# Patient Record
Sex: MALE | Race: WHITE | Employment: OTHER | URBAN - METROPOLITAN AREA
[De-identification: names, ages, dates, MRNs, and addresses within clinical notes are randomized per-mention and may not be internally consistent; named-entity substitution may affect disease eponyms.]

---

## 2017-07-27 PROBLEM — R44.1 VISUAL HALLUCINATION: Status: ACTIVE | Noted: 2017-07-27

## 2017-07-27 PROBLEM — H53.9 VISUAL CHANGES: Status: ACTIVE | Noted: 2017-07-27

## 2017-08-05 PROCEDURE — 86592 SYPHILIS TEST NON-TREP QUAL: CPT | Performed by: OTHER

## 2017-08-05 PROCEDURE — 82746 ASSAY OF FOLIC ACID SERUM: CPT | Performed by: OTHER

## 2017-08-05 PROCEDURE — 82607 VITAMIN B-12: CPT | Performed by: OTHER

## 2021-04-01 ENCOUNTER — HOSPITAL ENCOUNTER (EMERGENCY)
Facility: HOSPITAL | Age: 76
Discharge: HOME | End: 2021-04-01
Attending: EMERGENCY MEDICINE
Payer: MEDICARE

## 2021-04-01 VITALS
HEART RATE: 70 BPM | RESPIRATION RATE: 18 BRPM | TEMPERATURE: 98 F | SYSTOLIC BLOOD PRESSURE: 117 MMHG | OXYGEN SATURATION: 98 % | DIASTOLIC BLOOD PRESSURE: 56 MMHG

## 2021-04-01 DIAGNOSIS — N30.90 CYSTITIS: Primary | ICD-10-CM

## 2021-04-01 PROCEDURE — 80048 BASIC METABOLIC PNL TOTAL CA: CPT | Performed by: EMERGENCY MEDICINE

## 2021-04-01 PROCEDURE — 87186 SC STD MICRODIL/AGAR DIL: CPT | Performed by: EMERGENCY MEDICINE

## 2021-04-01 PROCEDURE — 99283 EMERGENCY DEPT VISIT LOW MDM: CPT

## 2021-04-01 PROCEDURE — 87077 CULTURE AEROBIC IDENTIFY: CPT | Performed by: EMERGENCY MEDICINE

## 2021-04-01 PROCEDURE — 36415 COLL VENOUS BLD VENIPUNCTURE: CPT

## 2021-04-01 PROCEDURE — 87086 URINE CULTURE/COLONY COUNT: CPT | Performed by: EMERGENCY MEDICINE

## 2021-04-01 PROCEDURE — 85025 COMPLETE CBC W/AUTO DIFF WBC: CPT | Performed by: EMERGENCY MEDICINE

## 2021-04-01 PROCEDURE — 81001 URINALYSIS AUTO W/SCOPE: CPT | Performed by: EMERGENCY MEDICINE

## 2021-04-01 RX ORDER — CEPHALEXIN 500 MG/1
500 CAPSULE ORAL 3 TIMES DAILY
Qty: 21 CAPSULE | Refills: 0 | Status: SHIPPED | OUTPATIENT
Start: 2021-04-01 | End: 2021-04-08

## 2021-04-01 NOTE — ED PROVIDER NOTES
Patient Seen in: [facility] AND [facility] Emergency Department      History   Patient presents with:  Urinary Symptoms    Stated Complaint: urinary burning    HPI/Subjective:   HPI    Patient is a 77-year-old gentleman with history of diabetes hypertension who supple. Cardiovascular: Normal rate, regular rhythm, normal heart sounds and intact distal pulses. Pulmonary/Chest: Effort normal and breath sounds normal. No respiratory distress. Abdominal: Soft.  Bowel sounds are normal. Exhibits no distension and -----------         ------                     CBC W/ DIFFERENTIAL[[id number]]          Abnormal            Final result                 Please view results for these tests on the individual orders.    RAINBOW DRAW BLUE   RAINBOW DRAW LAVENDER   RAINBOW

## 2021-04-03 ENCOUNTER — HOSPITAL ENCOUNTER (EMERGENCY)
Facility: HOSPITAL | Age: 76
Discharge: HOME | End: 2021-04-03
Attending: EMERGENCY MEDICINE
Payer: MEDICARE

## 2021-04-03 VITALS
SYSTOLIC BLOOD PRESSURE: 105 MMHG | WEIGHT: 197 LBS | BODY MASS INDEX: 27.58 KG/M2 | RESPIRATION RATE: 16 BRPM | HEIGHT: 71 IN | DIASTOLIC BLOOD PRESSURE: 72 MMHG | OXYGEN SATURATION: 98 % | HEART RATE: 61 BPM | TEMPERATURE: 98 F

## 2021-04-03 DIAGNOSIS — R73.9 HYPERGLYCEMIA: Primary | ICD-10-CM

## 2021-04-03 PROCEDURE — 82962 GLUCOSE BLOOD TEST: CPT

## 2021-04-03 PROCEDURE — 80048 BASIC METABOLIC PNL TOTAL CA: CPT | Performed by: EMERGENCY MEDICINE

## 2021-04-03 PROCEDURE — 85025 COMPLETE CBC W/AUTO DIFF WBC: CPT | Performed by: EMERGENCY MEDICINE

## 2021-04-03 PROCEDURE — 85025 COMPLETE CBC W/AUTO DIFF WBC: CPT

## 2021-04-03 PROCEDURE — 96360 HYDRATION IV INFUSION INIT: CPT

## 2021-04-03 PROCEDURE — 80048 BASIC METABOLIC PNL TOTAL CA: CPT

## 2021-04-03 PROCEDURE — 99285 EMERGENCY DEPT VISIT HI MDM: CPT

## 2021-04-03 PROCEDURE — 96361 HYDRATE IV INFUSION ADD-ON: CPT

## 2021-04-03 RX ORDER — INSULIN ASPART 100 [IU]/ML
10 INJECTION, SOLUTION INTRAVENOUS; SUBCUTANEOUS ONCE
Status: COMPLETED | OUTPATIENT
Start: 2021-04-03 | End: 2021-04-03

## 2021-04-03 NOTE — ED QUICK NOTES
Mari Rivera arrived to transport patient back home to [facility]. Patient left ED in stable condition. All discharge paperwork provided to [facility].

## 2021-04-03 NOTE — ED PROVIDER NOTES
`  Patient Seen in: [facility] Emergency Department      History   Patient presents with:  Hyperglycemia    Stated Complaint:     HPI/Subjective:   HPI    66-year-old male here yesterday for UTI put on Keflex with Klebsiella sensitive to Keflex here tenderness. Neurological: Alert and oriented to person, place, and time. No appreciated focal deficits  Skin: Skin is warm and dry. Nursing note and vitals reviewed. Differential diagnosis includes hyperglycemia, ongoing UTI.       ED Course     Lab of his blood sugar at home.                          Disposition and Plan     Clinical Impression:  Hyperglycemia  (primary encounter diagnosis)    Disposition:  Discharge  4/3/2021  3:54 pm    Follow-up:  Tsering Ackerman

## 2021-04-11 ENCOUNTER — HOSPITAL ENCOUNTER (EMERGENCY)
Facility: HOSPITAL | Age: 76
Discharge: HOME | End: 2021-04-11
Attending: EMERGENCY MEDICINE
Payer: MEDICARE

## 2021-04-11 VITALS
DIASTOLIC BLOOD PRESSURE: 59 MMHG | WEIGHT: 196.19 LBS | SYSTOLIC BLOOD PRESSURE: 103 MMHG | HEIGHT: 73 IN | TEMPERATURE: 98 F | OXYGEN SATURATION: 98 % | BODY MASS INDEX: 26 KG/M2 | RESPIRATION RATE: 18 BRPM | HEART RATE: 65 BPM

## 2021-04-11 DIAGNOSIS — E11.65 TYPE 2 DIABETES MELLITUS WITH HYPERGLYCEMIA, UNSPECIFIED WHETHER LONG TERM INSULIN USE (HCC): Primary | ICD-10-CM

## 2021-04-11 PROCEDURE — 85025 COMPLETE CBC W/AUTO DIFF WBC: CPT | Performed by: EMERGENCY MEDICINE

## 2021-04-11 PROCEDURE — 80048 BASIC METABOLIC PNL TOTAL CA: CPT | Performed by: EMERGENCY MEDICINE

## 2021-04-11 PROCEDURE — 81001 URINALYSIS AUTO W/SCOPE: CPT | Performed by: EMERGENCY MEDICINE

## 2021-04-11 PROCEDURE — 82962 GLUCOSE BLOOD TEST: CPT

## 2021-04-11 PROCEDURE — 99283 EMERGENCY DEPT VISIT LOW MDM: CPT

## 2021-04-11 PROCEDURE — 36415 COLL VENOUS BLD VENIPUNCTURE: CPT

## 2021-04-11 RX ORDER — INSULIN ASPART 100 [IU]/ML
0.1 INJECTION, SOLUTION INTRAVENOUS; SUBCUTANEOUS ONCE
Status: COMPLETED | OUTPATIENT
Start: 2021-04-11 | End: 2021-04-11

## 2021-04-11 NOTE — CM/SW NOTE
Contacted [address], waiting for the nurse on the pt's floor to call [facility] back.

## 2021-04-11 NOTE — ED PROVIDER NOTES
Patient Seen in: [facility] AND [facility] Emergency Department    History   Patient presents with:  Hyperglycemia    Stated Complaint: high bs    HPI    Patient is here from his living facility and basically here to get treatment for his diabetes.   Patient stat dysuria      Positive for stated complaint: high bs  Other systems are as noted in HPI. Constitutional and vital signs reviewed. All other systems reviewed and negative except as noted above.       Physical Exam     ED Triage Vitals [04/11/21 1056] will follow up with this tomorrow at the facility    ED Course     Labs Reviewed   BASIC METABOLIC PANEL (8) - Abnormal; Notable for the following components:       Result Value    Glucose 229 (*)     Sodium 133 (*)     BUN 82 (*)     Creatinine 2.10 (*) [id number]  [phone]    In 3 days  For re-check      Medications Prescribed:  Current Discharge Medication List

## 2021-04-11 NOTE — CM/SW NOTE
Spoke to the nurse at [facility]. She confirmed that there is not a nurse in the building on Sundays. She looked up his medication profile and it showed that he was not on any insulin and he takes Januvia.  He has medication reminders set up so the staff will

## 2021-04-11 NOTE — ED QUICK NOTES
Patient left ED ambulatory with steady gait to wheelchair to go back to [facility]. Pt speaking full clear sentences without difficulty and not noted to be in any distress. Pt verbalized understanding and importance of follow up and discharge instructions.  ER

## 2021-04-11 NOTE — ED INITIAL ASSESSMENT (HPI)
Pt comes from [facility] with blood sugar of 300. Per medics pt's insulin was taken away so pt can not administer it himself. There are no nurses on sundays to give pt his insulin so he was sent to the ER.

## 2021-04-11 NOTE — CM/SW NOTE
Updated the pt with the plan tomorrow that the nurse will be in to see him and go over his list of medications that he is supposed to be taking.

## 2021-05-14 ENCOUNTER — APPOINTMENT (OUTPATIENT)
Dept: ULTRASOUND IMAGING | Facility: HOSPITAL | Age: 76
End: 2021-05-14
Attending: EMERGENCY MEDICINE
Payer: MEDICARE

## 2021-05-14 ENCOUNTER — HOSPITAL ENCOUNTER (EMERGENCY)
Facility: HOSPITAL | Age: 76
Discharge: HOME | End: 2021-05-14
Attending: EMERGENCY MEDICINE
Payer: MEDICARE

## 2021-05-14 VITALS
SYSTOLIC BLOOD PRESSURE: 117 MMHG | TEMPERATURE: 98 F | HEART RATE: 64 BPM | BODY MASS INDEX: 27.44 KG/M2 | HEIGHT: 71 IN | RESPIRATION RATE: 16 BRPM | WEIGHT: 196 LBS | OXYGEN SATURATION: 99 % | DIASTOLIC BLOOD PRESSURE: 68 MMHG

## 2021-05-14 DIAGNOSIS — R60.0 BILATERAL LOWER EXTREMITY EDEMA: Primary | ICD-10-CM

## 2021-05-14 DIAGNOSIS — L03.90 CELLULITIS, UNSPECIFIED CELLULITIS SITE: ICD-10-CM

## 2021-05-14 PROCEDURE — 93970 EXTREMITY STUDY: CPT | Performed by: EMERGENCY MEDICINE

## 2021-05-14 PROCEDURE — 99284 EMERGENCY DEPT VISIT MOD MDM: CPT

## 2021-05-14 PROCEDURE — 84484 ASSAY OF TROPONIN QUANT: CPT | Performed by: EMERGENCY MEDICINE

## 2021-05-14 PROCEDURE — 85025 COMPLETE CBC W/AUTO DIFF WBC: CPT | Performed by: EMERGENCY MEDICINE

## 2021-05-14 PROCEDURE — 36415 COLL VENOUS BLD VENIPUNCTURE: CPT

## 2021-05-14 PROCEDURE — 80048 BASIC METABOLIC PNL TOTAL CA: CPT | Performed by: EMERGENCY MEDICINE

## 2021-05-14 PROCEDURE — 82962 GLUCOSE BLOOD TEST: CPT

## 2021-05-14 PROCEDURE — 93005 ELECTROCARDIOGRAM TRACING: CPT

## 2021-05-14 PROCEDURE — 83880 ASSAY OF NATRIURETIC PEPTIDE: CPT | Performed by: EMERGENCY MEDICINE

## 2021-05-14 PROCEDURE — 93010 ELECTROCARDIOGRAM REPORT: CPT | Performed by: EMERGENCY MEDICINE

## 2021-05-14 RX ORDER — CEPHALEXIN 500 MG/1
500 CAPSULE ORAL ONCE
Status: COMPLETED | OUTPATIENT
Start: 2021-05-14 | End: 2021-05-14

## 2021-05-14 RX ORDER — CEPHALEXIN 500 MG/1
500 CAPSULE ORAL 3 TIMES DAILY
Qty: 21 CAPSULE | Refills: 0 | Status: ON HOLD | OUTPATIENT
Start: 2021-05-14 | End: 2021-05-24

## 2021-05-14 RX ORDER — CEPHALEXIN 500 MG/1
500 CAPSULE ORAL 3 TIMES DAILY
Qty: 21 CAPSULE | Refills: 0 | Status: SHIPPED | OUTPATIENT
Start: 2021-05-14 | End: 2021-05-21

## 2021-05-14 NOTE — ED INITIAL ASSESSMENT (HPI)
PT came in via EMS for BLE swelling and skin redness for over 2 years. RR even and nonlabored, speaking in full sentences. Denies pain.

## 2021-05-14 NOTE — ED PROVIDER NOTES
Patient Seen in: [facility] Emergency Department    History   Patient presents with:  Rash Skin Problem    Stated Complaint:     HPI    Patient presents to the emergency department for evaluation for possible cellulitis.   Paramedics report that th (LIPITOR OR),  Take by mouth. Insulin Regular Human 100 UNIT/ML Injection Solution,  Inject into the skin once. Furosemide (LASIX OR),  Take 40 mg by mouth 2 (two) times daily. METFORMIN HCL ER OR,  Take by mouth. GABAPENTIN OR,  Take by mouth.    a asymmetry noted. Psychiatric:  Normal affect. Oriented. No unusual behavior. Interacting well. Patient appears with chronic lower extremity edema which she feels is a bit worse today.   We will check ultrasound and blood work    Patient test results and Plan     We recommend that you schedule follow up care with a primary care provider within the next three months to obtain basic health screening including reassessment of your blood pressure.       Clinical Impression:  Bilateral lower extremity edema

## 2021-05-14 NOTE — ED QUICK NOTES
PT safe to DC home per MD. Michel Given to dress self. DC teaching done, pt verbalizes understanding. Wheeled to exit.

## 2021-05-14 NOTE — CM/SW NOTE
Spoke to Dickson at [facility] and inquired where the pt should get his prescription filled. She stated they use [facility] and they deliver the medication.  She also said that the pt can bring a paper script back to [place] with him and she will

## 2021-05-21 ENCOUNTER — APPOINTMENT (OUTPATIENT)
Dept: GENERAL RADIOLOGY | Facility: HOSPITAL | Age: 76
DRG: 291 | End: 2021-05-21
Attending: EMERGENCY MEDICINE
Payer: MEDICARE

## 2021-05-21 ENCOUNTER — HOSPITAL ENCOUNTER (INPATIENT)
Facility: HOSPITAL | Age: 76
LOS: 3 days | Discharge: NURSING, REHAB OR LONG-TERM CARE | DRG: 291 | End: 2021-05-24
Attending: EMERGENCY MEDICINE | Admitting: HOSPITALIST
Payer: MEDICARE

## 2021-05-21 DIAGNOSIS — R60.0 LOWER EXTREMITY EDEMA: ICD-10-CM

## 2021-05-21 DIAGNOSIS — I50.9 ACUTE ON CHRONIC CONGESTIVE HEART FAILURE, UNSPECIFIED HEART FAILURE TYPE (HCC): Primary | ICD-10-CM

## 2021-05-21 PROBLEM — R73.9 HYPERGLYCEMIA: Status: ACTIVE | Noted: 2021-05-21

## 2021-05-21 PROBLEM — D69.6 THROMBOCYTOPENIA (HCC): Status: ACTIVE | Noted: 2021-05-21

## 2021-05-21 PROBLEM — D64.9 ANEMIA: Status: ACTIVE | Noted: 2021-05-21

## 2021-05-21 PROBLEM — R79.89 AZOTEMIA: Status: ACTIVE | Noted: 2021-05-21

## 2021-05-21 PROCEDURE — 99285 EMERGENCY DEPT VISIT HI MDM: CPT

## 2021-05-21 PROCEDURE — 84484 ASSAY OF TROPONIN QUANT: CPT | Performed by: EMERGENCY MEDICINE

## 2021-05-21 PROCEDURE — 81003 URINALYSIS AUTO W/O SCOPE: CPT | Performed by: NURSE PRACTITIONER

## 2021-05-21 PROCEDURE — 82962 GLUCOSE BLOOD TEST: CPT

## 2021-05-21 PROCEDURE — 83880 ASSAY OF NATRIURETIC PEPTIDE: CPT | Performed by: EMERGENCY MEDICINE

## 2021-05-21 PROCEDURE — 96374 THER/PROPH/DIAG INJ IV PUSH: CPT

## 2021-05-21 PROCEDURE — 84466 ASSAY OF TRANSFERRIN: CPT | Performed by: NURSE PRACTITIONER

## 2021-05-21 PROCEDURE — 85025 COMPLETE CBC W/AUTO DIFF WBC: CPT | Performed by: EMERGENCY MEDICINE

## 2021-05-21 PROCEDURE — 82728 ASSAY OF FERRITIN: CPT | Performed by: NURSE PRACTITIONER

## 2021-05-21 PROCEDURE — 83540 ASSAY OF IRON: CPT | Performed by: NURSE PRACTITIONER

## 2021-05-21 PROCEDURE — 93005 ELECTROCARDIOGRAM TRACING: CPT

## 2021-05-21 PROCEDURE — 93010 ELECTROCARDIOGRAM REPORT: CPT | Performed by: EMERGENCY MEDICINE

## 2021-05-21 PROCEDURE — 84145 PROCALCITONIN (PCT): CPT | Performed by: NURSE PRACTITIONER

## 2021-05-21 PROCEDURE — 84443 ASSAY THYROID STIM HORMONE: CPT | Performed by: NURSE PRACTITIONER

## 2021-05-21 PROCEDURE — 83036 HEMOGLOBIN GLYCOSYLATED A1C: CPT | Performed by: NURSE PRACTITIONER

## 2021-05-21 PROCEDURE — 80048 BASIC METABOLIC PNL TOTAL CA: CPT | Performed by: EMERGENCY MEDICINE

## 2021-05-21 PROCEDURE — 82607 VITAMIN B-12: CPT | Performed by: NURSE PRACTITIONER

## 2021-05-21 PROCEDURE — 71045 X-RAY EXAM CHEST 1 VIEW: CPT | Performed by: EMERGENCY MEDICINE

## 2021-05-21 RX ORDER — MULTIVITAMIN WITH IRON
100 TABLET ORAL DAILY
COMMUNITY

## 2021-05-21 RX ORDER — FINASTERIDE 5 MG/1
5 TABLET, FILM COATED ORAL NIGHTLY
COMMUNITY

## 2021-05-21 RX ORDER — BUMETANIDE 0.25 MG/ML
1 INJECTION, SOLUTION INTRAMUSCULAR; INTRAVENOUS
Status: DISCONTINUED | OUTPATIENT
Start: 2021-05-21 | End: 2021-05-24

## 2021-05-21 RX ORDER — ASPIRIN 81 MG/1
81 TABLET ORAL DAILY
Status: ON HOLD | COMMUNITY
End: 2021-05-21

## 2021-05-21 RX ORDER — FINASTERIDE 5 MG/1
5 TABLET, FILM COATED ORAL NIGHTLY
Status: DISCONTINUED | OUTPATIENT
Start: 2021-05-21 | End: 2021-05-24

## 2021-05-21 RX ORDER — DEXTROSE MONOHYDRATE 25 G/50ML
50 INJECTION, SOLUTION INTRAVENOUS
Status: DISCONTINUED | OUTPATIENT
Start: 2021-05-21 | End: 2021-05-24

## 2021-05-21 RX ORDER — POTASSIUM CHLORIDE 20 MEQ/1
20 TABLET, EXTENDED RELEASE ORAL DAILY
Status: ON HOLD | COMMUNITY
End: 2021-08-20

## 2021-05-21 RX ORDER — TAMSULOSIN HYDROCHLORIDE 0.4 MG/1
0.4 CAPSULE ORAL
Status: DISCONTINUED | OUTPATIENT
Start: 2021-05-21 | End: 2021-05-24

## 2021-05-21 RX ORDER — BUMETANIDE 0.25 MG/ML
1 INJECTION, SOLUTION INTRAMUSCULAR; INTRAVENOUS ONCE
Status: COMPLETED | OUTPATIENT
Start: 2021-05-21 | End: 2021-05-21

## 2021-05-21 RX ORDER — OXYBUTYNIN CHLORIDE 5 MG/1
5 TABLET, EXTENDED RELEASE ORAL DAILY
COMMUNITY

## 2021-05-21 RX ORDER — ACETAMINOPHEN 325 MG/1
650 TABLET ORAL EVERY 6 HOURS PRN
Status: DISCONTINUED | OUTPATIENT
Start: 2021-05-21 | End: 2021-05-24

## 2021-05-21 RX ORDER — MELATONIN
1000 DAILY
COMMUNITY

## 2021-05-21 RX ORDER — LISINOPRIL 2.5 MG/1
2.5 TABLET ORAL DAILY
Status: DISCONTINUED | OUTPATIENT
Start: 2021-05-22 | End: 2021-05-24

## 2021-05-21 RX ORDER — TAMSULOSIN HYDROCHLORIDE 0.4 MG/1
0.4 CAPSULE ORAL NIGHTLY
COMMUNITY

## 2021-05-21 NOTE — ED INITIAL ASSESSMENT (HPI)
BLE pitting edema, states that his leg are typically slightly swollen but noted worsening edema this morning. Denies SOB.

## 2021-05-21 NOTE — CM/SW NOTE
05/21/21 1700   CM/SW Screening   Referral Source Physician   Information Source Chart review   Patient's Mental Status Alert;Oriented   Patient's Home Environment Supportive Living   Number of Levels in Home 2   Patient lives with Alone   CM/SW/[place]

## 2021-05-21 NOTE — ED QUICK NOTES
Orders for admission, patient is aware of plan and ready to go upstairs. Any questions, please call ED TERRENCE [facility] extension [phone].       Type of COVID test sent: Rapid  COVID Suspicion level: Low    Titratable drug(s) infusing: NA  Rate: NA    LOC at time o

## 2021-05-21 NOTE — CONSULTS
Reason for Consultation: Heart failure    Assessment/Plan:     1. Acute on chronic HFpEF  - last Echo 2017  2.  HTN  3. CKD      PLAN:  - diurese  - Echo      HPI:     Dexter Boudreaux is a 68year old male who is referred by Dr. Dawit Spann for evaluation and ma auscultation. GI:soft, non-tender;rectal deferred. MS:adequate gait for exercise testing. EXT:no clubbing or cyanosis. SKIN:no rashes,lesions. NEURO/PSYCH:alert and oriented. Normal affect.     CV:   PALP:PMI not displaced; no lifts, thrills or rubs

## 2021-05-21 NOTE — H&P
[facility] Hospitalist Team  History and Physical  Admit Date:  5/21/21    ASSESSMENT / PLAN:   Patient is a 69 yo male from [facility] with hx CVA, CKD, PAF, PN, DM type II, HTN, HL, BPH with recent ER visit on 5/14 for LE and cellulitis discharged detailed above.  Discussed plan of care with Dr. Sundar Briggs RN, NP  [address] [facility] Team  Pager [phone]  Answering Service number: [phone]  5/21/2021      HISTORY:   CC: Patient presents with:  Swelling Edema       PCP: Smokeless tobacco: Never Used    Alcohol use: No       Fam Hx  No family history on file. Review of Systems  A comprehensive 10 point review of systems was completed. Pertinent positives and negatives noted in the the HPI.       DIAGNOSTIC DATA:   CBC/C CV: Heart with regular rate and rhythm   Abd: Abdomen soft, nontender, nondistended   MSK:  Edema bilateral lower ext edema 3+, slight erythema left lower ext  Skin: no rashes or lesions   Psych: mood stable, cooperative  Neuro: no focal deficits    Asse

## 2021-05-22 ENCOUNTER — APPOINTMENT (OUTPATIENT)
Dept: CV DIAGNOSTICS | Facility: HOSPITAL | Age: 76
DRG: 291 | End: 2021-05-22
Attending: NURSE PRACTITIONER
Payer: MEDICARE

## 2021-05-22 ENCOUNTER — APPOINTMENT (OUTPATIENT)
Dept: PICC SERVICES | Facility: HOSPITAL | Age: 76
DRG: 291 | End: 2021-05-22
Attending: HOSPITALIST
Payer: MEDICARE

## 2021-05-22 PROCEDURE — 93306 TTE W/DOPPLER COMPLETE: CPT | Performed by: NURSE PRACTITIONER

## 2021-05-22 PROCEDURE — 36410 VNPNXR 3YR/> PHY/QHP DX/THER: CPT

## 2021-05-22 PROCEDURE — 97535 SELF CARE MNGMENT TRAINING: CPT

## 2021-05-22 PROCEDURE — 82962 GLUCOSE BLOOD TEST: CPT

## 2021-05-22 PROCEDURE — 80048 BASIC METABOLIC PNL TOTAL CA: CPT | Performed by: NURSE PRACTITIONER

## 2021-05-22 PROCEDURE — 85025 COMPLETE CBC W/AUTO DIFF WBC: CPT | Performed by: NURSE PRACTITIONER

## 2021-05-22 PROCEDURE — 97161 PT EVAL LOW COMPLEX 20 MIN: CPT

## 2021-05-22 PROCEDURE — 83735 ASSAY OF MAGNESIUM: CPT | Performed by: HOSPITALIST

## 2021-05-22 PROCEDURE — 97165 OT EVAL LOW COMPLEX 30 MIN: CPT

## 2021-05-22 PROCEDURE — 76937 US GUIDE VASCULAR ACCESS: CPT

## 2021-05-22 RX ORDER — ATORVASTATIN CALCIUM 40 MG/1
40 TABLET, FILM COATED ORAL NIGHTLY
Status: DISCONTINUED | OUTPATIENT
Start: 2021-05-22 | End: 2021-05-24

## 2021-05-22 RX ORDER — ASPIRIN 81 MG/1
81 TABLET, CHEWABLE ORAL DAILY
COMMUNITY

## 2021-05-22 RX ORDER — ASPIRIN 81 MG/1
81 TABLET, CHEWABLE ORAL DAILY
Status: DISCONTINUED | OUTPATIENT
Start: 2021-05-22 | End: 2021-05-24

## 2021-05-22 NOTE — PROGRESS NOTES
Assessment and Plan:     1. Acute on chronic HFpEF  - last Echo 2017  2. HTN  3.  CKD        PLAN:  - diurese  - Echo      Subjective:     No new c/o    Objective:   Temp:  [96.5 °F (35.8 °C)-97.7 °F (36.5 °C)] 97.5 °F (36.4 °C)  Pulse:  [60-65] 65  Resp: changes have occurred Electronically signed on 05/21/2021 at 16:02 by Edgar Malik

## 2021-05-22 NOTE — PROGRESS NOTES
[facility] Hospitalist Progress Note     CC: Hospital Follow up    PCP: Patrica Ganser       Assessment/Plan:     Principal Problem:    Acute on chronic congestive heart failure, unspecified heart failure type ([facility]  Active Problems:     Thrombocytopenia ([facility]    An am  -diet-ADA     Prophy  -SCD  -eliquis     Dispo  -pending clinical course  PCP: Yordan Cordero    Questions/concerns were discussed with patient and/or family by bedside.       Thank Manjinder Mena MD    [facility] Hospitalist     Subjective:     No CP, SOB, or 168 hours. Invalid input(s): ALPHOS, TBIL, DBIL, TPROT      Imaging:  XR CHEST AP PORTABLE  (CPT=71045)    Result Date: 5/21/2021  CONCLUSION:   Moderate cardiomegaly with central pulmonary vascular congestion and probable interstitial pulmonary edema.

## 2021-05-22 NOTE — PROGRESS NOTES
Vascular Access Consult Note  5/22/2021     Reviewed H&P and current condition.   Past Medical History:  No date: BPH (benign prostatic hyperplasia)  No date: Cellulitis  No date: CKD (chronic kidney disease)  No date: Diabetes [facility]  No date: Essential hyp changes.     REBECCA PadgettN RN, [facility]

## 2021-05-22 NOTE — PLAN OF CARE
Pt is aox3, resting in bed, bed is low and locked in place, call light is within reach.  Receiving iv abx, bumex  Problem: Patient Centered Care  Goal: Patient preferences are identified and integrated in the patient's plan of care  Description: Interventio indicated  - Evaluate effectiveness of antiarrhythmic and heart rate control medications as ordered  - Initiate emergency measures for life threatening arrhythmias  - Monitor electrolytes and administer replacement therapy as ordered  Outcome: Progressing

## 2021-05-22 NOTE — PROGRESS NOTES
[facility] Pharmacy Note: Antimicrobial Weight Based Dose Adjustment for: ceftriaxone (ROCEPHIN)    Seferino Negron is a 68year old patient who has been prescribed ceftriaxone (ROCEPHIN) 1000 mg every 24 hours.     Estimated Creatinine Clearance: 43.7 mL/min (A)

## 2021-05-22 NOTE — PLAN OF CARE
Problem: Patient Centered Care  Goal: Patient preferences are identified and integrated in the patient's plan of care  Description: Interventions:  - What would you like us to know as we care for you?  I live [facility]  - Provide timely, complete, and ac indicated  - Evaluate effectiveness of antiarrhythmic and heart rate control medications as ordered  - Initiate emergency measures for life threatening arrhythmias  - Monitor electrolytes and administer replacement therapy as ordered  Outcome: Progressing

## 2021-05-22 NOTE — PHYSICAL THERAPY NOTE
PHYSICAL THERAPY EVALUATION - INPATIENT     Room Number: 345/345-A  Evaluation Date: 5/22/2021  Type of Evaluation: Initial   Physician Order: PT Eval and Treat    Presenting Problem: CHF; B LE pitting edema  Reason for Therapy: Mobility Dysfunction and D MEDICAL/SOCIAL HISTORY     History related to current admission:      Problem List  Principal Problem:    Acute on chronic congestive heart failure, unspecified heart failure type [facility])  Active Problems:     Thrombocytopenia (HCC)    Anemia    Acute on  legs/ankles      AM-PAC '6-Clicks' INPATIENT SHORT FORM - BASIC MOBILITY  How much difficulty does the patient currently have. ..  -   Turning over in bed (including adjusting bedclothes, sheets and blankets)?: A Little   -   Sitting down on and standing up patient in preparation for discharge.    Goal #5   Current Status    Goal #6    Goal #6  Current Status

## 2021-05-22 NOTE — OCCUPATIONAL THERAPY NOTE
OCCUPATIONAL THERAPY EVALUATION - INPATIENT     Room Number: 345/345-A  Evaluation Date: 5/22/2021  Type of Evaluation: Initial  Presenting Problem: Admitted 5/21 with BLE edema.      Physician Order: IP Consult to Occupational Therapy  Reason for Therapy: patient's Approx Degree of Impairment: 32.79% has been calculated based on documentation in the [facility] '6 clicks' Inpatient Daily Activity Short Form. Research supports that patients with this level of impairment may benefit from 24 hr assist initially. with bathing & LB dressing as needed (pt also uses a cane as reacher to assist with LB dressing as needed). Pt ambulates ind.  Within apt, uses stand up walker for hallway ambulation    OCCUPATIONAL THERAPY EXAMINATION      OBJECTIVE  Precautions: Fluid res Comment:     Patient will complete toileting with MOD IND  Comment:     Patient will tolerate standing for 3-5 minutes in prep for adls with SBA  Comment:              Goals  on: 21  Frequency: 3-5x/wk

## 2021-05-23 PROCEDURE — 85025 COMPLETE CBC W/AUTO DIFF WBC: CPT | Performed by: HOSPITALIST

## 2021-05-23 PROCEDURE — 82962 GLUCOSE BLOOD TEST: CPT

## 2021-05-23 PROCEDURE — 83735 ASSAY OF MAGNESIUM: CPT | Performed by: HOSPITALIST

## 2021-05-23 PROCEDURE — 80048 BASIC METABOLIC PNL TOTAL CA: CPT | Performed by: HOSPITALIST

## 2021-05-23 RX ORDER — MAGNESIUM SULFATE HEPTAHYDRATE 40 MG/ML
2 INJECTION, SOLUTION INTRAVENOUS ONCE
Status: COMPLETED | OUTPATIENT
Start: 2021-05-23 | End: 2021-05-23

## 2021-05-23 NOTE — PROGRESS NOTES
[facility] Hospitalist Progress Note     CC: Hospital Follow up    PCP: Madeline Peraza       Assessment/Plan:     Principal Problem:    Acute on chronic congestive heart failure, unspecified heart failure type ([facility]  Active Problems:     Thrombocytopenia ([facility]    An oxybutnin     [facility]  -Full code  -no POA     FEN  -lytes in am  -diet-ADA     Prophy  -SCD  -eliquis     Dispo  -Home tomorrow  PCP: José Antonio Fan    Questions/concerns were discussed with patient and/or family by bedside.       Thank Martell Gracia MD    [facility] 36* 44* 57*   CA 9.0 9.0 7.3*    139 143   K 4.8 4.4 3.3*    107 112   CO2 27.0 28.0 26.0       No results for input(s): ALT, AST, ALB, AMYLASE, LIPASE, LDH in the last 168 hours.     Invalid input(s): ALPHOS, TBIL, DBIL, TPROT      Imaging:  XR

## 2021-05-23 NOTE — PROGRESS NOTES
Assessment and Plan:     1. Acute on chronic HFpEF  - last Echo 2017  2. HTN  3.  CKD        PLAN:  - diurese  - Echo      Subjective:     Feels better    Objective:   Temp:  [97.5 °F (36.4 °C)-98.9 °F (37.2 °C)] 98.9 °F (37.2 °C)  Pulse:  [64-72] 66  Res

## 2021-05-23 NOTE — PLAN OF CARE
Problem: Patient Centered Care  Goal: Patient preferences are identified and integrated in the patient's plan of care  Description: Interventions:  - What would you like us to know as we care for you?   - Provide timely, complete, and accurate informatio replacement therapy as ordered  Outcome: Completed     Problem: Diabetes/Glucose Control  Goal: Glucose maintained within prescribed range  Description: INTERVENTIONS:  - Monitor Blood Glucose as ordered  - Assess for signs and symptoms of hyperglycemia an

## 2021-05-24 VITALS
DIASTOLIC BLOOD PRESSURE: 62 MMHG | TEMPERATURE: 98 F | OXYGEN SATURATION: 99 % | HEIGHT: 71 IN | RESPIRATION RATE: 18 BRPM | SYSTOLIC BLOOD PRESSURE: 102 MMHG | HEART RATE: 74 BPM | WEIGHT: 214.81 LBS | BODY MASS INDEX: 30.07 KG/M2

## 2021-05-24 PROBLEM — R79.89 AZOTEMIA: Status: RESOLVED | Noted: 2021-05-21 | Resolved: 2021-05-24

## 2021-05-24 PROBLEM — R73.9 HYPERGLYCEMIA: Status: RESOLVED | Noted: 2021-05-21 | Resolved: 2021-05-24

## 2021-05-24 PROBLEM — I50.9 ACUTE ON CHRONIC CONGESTIVE HEART FAILURE, UNSPECIFIED HEART FAILURE TYPE (HCC): Status: RESOLVED | Noted: 2021-05-21 | Resolved: 2021-05-24

## 2021-05-24 PROCEDURE — 85025 COMPLETE CBC W/AUTO DIFF WBC: CPT | Performed by: HOSPITALIST

## 2021-05-24 PROCEDURE — 80048 BASIC METABOLIC PNL TOTAL CA: CPT | Performed by: HOSPITALIST

## 2021-05-24 PROCEDURE — 82962 GLUCOSE BLOOD TEST: CPT

## 2021-05-24 PROCEDURE — 83735 ASSAY OF MAGNESIUM: CPT | Performed by: HOSPITALIST

## 2021-05-24 RX ORDER — PENTOXIFYLLINE 400 MG/1
400 TABLET, EXTENDED RELEASE ORAL 2 TIMES DAILY
Refills: 0 | Status: SHIPPED | COMMUNITY
Start: 2021-05-24

## 2021-05-24 RX ORDER — FUROSEMIDE 40 MG/1
TABLET ORAL
Qty: 120 TABLET | Refills: 6 | Status: ON HOLD | OUTPATIENT
Start: 2021-05-24 | End: 2021-08-20

## 2021-05-24 NOTE — PROGRESS NOTES
Assessment and Plan:     1. Acute on chronic HFpEF  - Echo EF 55-60%; CVP-3; RVSP-21  2. HTN  3.  CKD        PLAN:  - dikwane--home on Lasix 80/40  - [facility]        Subjective:     Feels better    Objective:   Temp:  [97.4 °F (36.3 °

## 2021-05-24 NOTE — DISCHARGE SUMMARY
[facility] Hospitalist Discharge Summary   Patient ID:  Gina Gustafson  [id number]  59 year old  4/22/1945    Admit date: 5/21/2021  Discharge date: 5/24/2021    Primary Care Physician: Dana Arenas   Attending Physician: Liza Loza MD   Consults:   Consultants legs   -no fever, normal wbc  -keflex completed as outpt, ceftriaxone started 5/22, will stop at discharge  -Tubigrip  -diuretics as above     Anemia  Thrombocytopenia   -pt with noted decline of hgb and platelets since 1/5838  -admission hgb 9.5 with plat known as: TRENTAL  What changed: Another medication with the same name was removed. Continue taking this medication, and follow the directions you see here.         CONTINUE taking these medications    apixaban 5 MG Tabs  Commonly known as: ELIQUIS     aspi [facility] Team  Pager [phone]  Answering Service number: [phone]  5/24/2021      SEE ATTENDING NOTE BELOW    Patient seen and examined independently. Discussed with APN and agree with note above    Patient improved.   Stable fo

## 2021-05-24 NOTE — CM/SW NOTE
05/24/21 1400   Discharge disposition   Expected discharge disposition Assisted [facility]   Name of [address] [address] Nurs   Discharge transportation [address] [facility]   IFTIKHAR received for discharge.  Patient is scheduled to return to [facility]

## 2021-05-24 NOTE — PROGRESS NOTES
Reviewed follow up appontments, medication list, and reinforced CHF education material with him. IV removed by this RN. Tubigrip (size F) applied. Belongings with pt. Dev Tong will be picking up pt.

## 2021-05-24 NOTE — PAYOR COMM NOTE
--------------  ADMISSION REVIEW     Payor: Hari Montesd #:  [id number]  Authorization Number: [id number]    Admit date: 5/21/21  Admit time:  4:36 PM       Admitting Physician: Chip Smart MD  Attending Physician:  Chip Smart MD and negative except as noted above.     Physical Exam     ED Triage Vitals [05/21/21 1152]   /51   Pulse 62   Resp 18   Temp 97.6 °F (36.4 °C)   Temp src Oral   SpO2 97 %   O2 Device None (Room air)       Current:/64   Pulse 61   Temp 97.6 °F (3 DIFFERENTIAL - Abnormal; Notable for the following components:    RBC 2.89 (*)     HGB 9.5 (*)     HCT 29.2 (*)     .0 (*)     RDW-SD 46.5 (*)     .0 (*)     All other components within normal limits   TROPONIN I - Normal   RAPID SARS-COV-2 B [facility])  (primary encounter diagnosis)  Lower extremity edema     Disposition:  Admit  5/21/2021  2:10 pm    Follow-up:  No follow-up provider specified.   We recommend that you schedule follow up care with a primary care provider within the next three months Anemia  Thrombocytopenia   -pt with noted decline of hgb and platelets since 9/4197  -admission hgb 9.5 with platelets 239  -hold ASA and trental, continue eliquis  -no active bleeding   -iron studies, b12 and ferritin  -trend     HTN  -home meds-lisin or urinary issues. No bleeding.  Pt has limited knowledge on his medical prob    OBJECTIVE:  /70   Pulse 63   Temp 97.6 °F (36.4 °C) (Oral)   Resp 16   Ht 5' 11\" (1.803 m)   Wt 205 lb (93 kg)   SpO2 98%   BMI 28.59 kg/m²     GENERAL: no apparent dist Radiology: XR CHEST AP PORTABLE  (CPT=71045)    Result Date: 5/21/2021  CONCLUSION:   Moderate cardiomegaly with central pulmonary vascular congestion and probable interstitial pulmonary edema.   Blunted costophrenic angle, possible trace left pleural e hgb and platelets since 0/3146  -admission hgb 9.5 with platelets 588  -hold ASA and trental, continue eliquis  -no active bleeding   -iron studies, b12 and ferritin  -trend     HTN  -home meds-lisinopril 2.5 mg and lopressor 50 mg bid  -continue home meds 1720 Given 1 Units Subcutaneous (Left Upper Arm) Done, Maria Fernanda Watson RN    5/23/2021 1256 Given 2 Units Subcutaneous (Left Upper Arm) Done, Chelsea LYONS RN      insulin detemir (LEVEMIR) 100 UNIT/ML flextouch 15 Units     Date Action Dose Route User    5/23/202 male from [address] [place] [address] with hx CVA, CKD, PAF, PN, DM type II, HTN, HL, BPH with recent ER visit on 5/14 for LE and cellulitis discharged on keflex who presents with LE edema with acute chf,  see below for details       Acute CHF  -2017 echo with 05/21/21  1202 05/22/21  0530   RBC 2.89* 3.21*   HGB 9.5* 10.4*   HCT 29.2* 32.1*   .0* 100.0   MCH 32.9 32.4   MCHC 32.5 32.4   RDW 12.5 12.4   NEPRELIM 3.33 3.28   WBC 5.8 5.3   .0* 145.0*                 Recent Labs   Lab 05/21/21  1202 0 -pt with noted decline of hgb and platelets since 9/0420  -admission hgb 9.5 with platelets 622  -hold ASA and trental, continue eliquis  -no active bleeding   -iron studies, b12 and ferritin  -trend   -Hgb 8.7, no signs of bleeding, monitor closely repe

## 2021-05-25 NOTE — PAYOR COMM NOTE
--------------  DISCHARGE REVIEW    Payor: Hari Newport Kika #:  [id number]  Authorization Number: [id number]    Admit date: 5/21/21  Admit time:   4:36 PM  Discharge Date: 5/24/2021  2:09 PM     Admitting Physician: Samy Medina MD ER visit on 5/14 for LE and cellulitis discharged on keflex who presents with LE edema with acute on chronic diastolic chf improved with diuretics.  Pt with noted anemia and thrombocytopenia, pt kept of trental, follow up with hematology as outpt Pt dischar Date: 5/21/2021  CONCLUSION:   Moderate cardiomegaly with central pulmonary vascular congestion and probable interstitial pulmonary edema. Blunted costophrenic angle, possible trace left pleural effusion. Findings are suggestive of fluid overload/CHF. follow up: Follow-up Information     Craig Guerrero.     Specialty: Family Medicine  Contact information:  [address] [facility] [address]  Cullen Emanuel MD. Schedule an appointment as soon as possible for a visit in

## 2021-08-14 ENCOUNTER — HOSPITAL ENCOUNTER (INPATIENT)
Facility: HOSPITAL | Age: 76
LOS: 5 days | Discharge: NURSING, REHAB OR LONG-TERM CARE | DRG: 393 | End: 2021-08-20
Attending: HOSPITALIST | Admitting: HOSPITALIST
Payer: MEDICARE

## 2021-08-14 ENCOUNTER — APPOINTMENT (OUTPATIENT)
Dept: GENERAL RADIOLOGY | Facility: HOSPITAL | Age: 76
DRG: 393 | End: 2021-08-14
Attending: NURSE PRACTITIONER
Payer: MEDICARE

## 2021-08-14 ENCOUNTER — APPOINTMENT (OUTPATIENT)
Dept: CT IMAGING | Facility: HOSPITAL | Age: 76
DRG: 393 | End: 2021-08-14
Attending: NURSE PRACTITIONER
Payer: MEDICARE

## 2021-08-14 DIAGNOSIS — K63.89 PNEUMATOSIS OF INTESTINES: Primary | ICD-10-CM

## 2021-08-14 DIAGNOSIS — R11.2 NAUSEA AND VOMITING, INTRACTABILITY OF VOMITING NOT SPECIFIED, UNSPECIFIED VOMITING TYPE: ICD-10-CM

## 2021-08-14 LAB
ALBUMIN SERPL-MCNC: 3.6 G/DL (ref 3.4–5)
ALP LIVER SERPL-CCNC: 158 U/L
ALT SERPL-CCNC: 53 U/L
ANION GAP SERPL CALC-SCNC: 8 MMOL/L (ref 0–18)
AST SERPL-CCNC: 41 U/L (ref 15–37)
BASOPHILS # BLD AUTO: 0.02 X10(3) UL (ref 0–0.2)
BASOPHILS NFR BLD AUTO: 0.3 %
BILIRUB DIRECT SERPL-MCNC: 0.2 MG/DL (ref 0–0.2)
BILIRUB SERPL-MCNC: 0.5 MG/DL (ref 0.1–2)
BUN BLD-MCNC: 81 MG/DL (ref 7–18)
BUN/CREAT SERPL: 34.6 (ref 10–20)
CALCIUM BLD-MCNC: 8.6 MG/DL (ref 8.5–10.1)
CHLORIDE SERPL-SCNC: 100 MMOL/L (ref 98–112)
CO2 SERPL-SCNC: 27 MMOL/L (ref 21–32)
CREAT BLD-MCNC: 2.34 MG/DL
DEPRECATED RDW RBC AUTO: 42.8 FL (ref 35.1–46.3)
EOSINOPHIL # BLD AUTO: 0.08 X10(3) UL (ref 0–0.7)
EOSINOPHIL NFR BLD AUTO: 1 %
ERYTHROCYTE [DISTWIDTH] IN BLOOD BY AUTOMATED COUNT: 11.8 % (ref 11–15)
GLUCOSE BLD-MCNC: 280 MG/DL (ref 70–99)
HCT VFR BLD AUTO: 29.7 %
HGB BLD-MCNC: 9.8 G/DL
IMM GRANULOCYTES # BLD AUTO: 0.03 X10(3) UL (ref 0–1)
IMM GRANULOCYTES NFR BLD: 0.4 %
LIPASE SERPL-CCNC: 202 U/L (ref 73–393)
LYMPHOCYTES # BLD AUTO: 1.54 X10(3) UL (ref 1–4)
LYMPHOCYTES NFR BLD AUTO: 19.6 %
M PROTEIN MFR SERPL ELPH: 7.5 G/DL (ref 6.4–8.2)
MCH RBC QN AUTO: 32.9 PG (ref 26–34)
MCHC RBC AUTO-ENTMCNC: 33 G/DL (ref 31–37)
MCV RBC AUTO: 99.7 FL
MONOCYTES # BLD AUTO: 0.57 X10(3) UL (ref 0.1–1)
MONOCYTES NFR BLD AUTO: 7.2 %
NEUTROPHILS # BLD AUTO: 5.63 X10 (3) UL (ref 1.5–7.7)
NEUTROPHILS # BLD AUTO: 5.63 X10(3) UL (ref 1.5–7.7)
NEUTROPHILS NFR BLD AUTO: 71.5 %
OSMOLALITY SERPL CALC.SUM OF ELEC: 314 MOSM/KG (ref 275–295)
PLATELET # BLD AUTO: 137 10(3)UL (ref 150–450)
POTASSIUM SERPL-SCNC: 5 MMOL/L (ref 3.5–5.1)
RBC # BLD AUTO: 2.98 X10(6)UL
SARS-COV-2 RNA RESP QL NAA+PROBE: NOT DETECTED
SODIUM SERPL-SCNC: 135 MMOL/L (ref 136–145)
TROPONIN I SERPL-MCNC: <0.045 NG/ML (ref ?–0.04)
WBC # BLD AUTO: 7.9 X10(3) UL (ref 4–11)

## 2021-08-14 PROCEDURE — 71045 X-RAY EXAM CHEST 1 VIEW: CPT | Performed by: NURSE PRACTITIONER

## 2021-08-14 PROCEDURE — 74176 CT ABD & PELVIS W/O CONTRAST: CPT | Performed by: NURSE PRACTITIONER

## 2021-08-14 RX ORDER — ONDANSETRON 2 MG/ML
4 INJECTION INTRAMUSCULAR; INTRAVENOUS ONCE
Status: COMPLETED | OUTPATIENT
Start: 2021-08-14 | End: 2021-08-14

## 2021-08-15 ENCOUNTER — APPOINTMENT (OUTPATIENT)
Dept: GENERAL RADIOLOGY | Facility: HOSPITAL | Age: 76
DRG: 393 | End: 2021-08-15
Attending: EMERGENCY MEDICINE
Payer: MEDICARE

## 2021-08-15 ENCOUNTER — APPOINTMENT (OUTPATIENT)
Dept: GENERAL RADIOLOGY | Facility: HOSPITAL | Age: 76
DRG: 393 | End: 2021-08-15
Attending: SURGERY
Payer: MEDICARE

## 2021-08-15 PROBLEM — K63.89 PNEUMATOSIS OF INTESTINES: Status: ACTIVE | Noted: 2021-08-15

## 2021-08-15 PROBLEM — R11.2 NAUSEA AND VOMITING, INTRACTABILITY OF VOMITING NOT SPECIFIED, UNSPECIFIED VOMITING TYPE: Status: ACTIVE | Noted: 2021-08-15

## 2021-08-15 LAB
ALBUMIN SERPL-MCNC: 3 G/DL (ref 3.4–5)
ALBUMIN SERPL-MCNC: 3.2 G/DL (ref 3.4–5)
ALBUMIN/GLOB SERPL: 0.9 {RATIO} (ref 1–2)
ALP LIVER SERPL-CCNC: 116 U/L
ALP LIVER SERPL-CCNC: 123 U/L
ALT SERPL-CCNC: 40 U/L
ALT SERPL-CCNC: 41 U/L
AMYLASE SERPL-CCNC: 38 U/L (ref 25–115)
ANION GAP SERPL CALC-SCNC: 8 MMOL/L (ref 0–18)
ANION GAP SERPL CALC-SCNC: 9 MMOL/L (ref 0–18)
ANTIBODY SCREEN: NEGATIVE
APTT PPP: 40.6 SECONDS (ref 23.2–35.3)
APTT PPP: >240 SECONDS (ref 23.2–35.3)
AST SERPL-CCNC: 29 U/L (ref 15–37)
AST SERPL-CCNC: 30 U/L (ref 15–37)
BASOPHILS # BLD AUTO: 0.03 X10(3) UL (ref 0–0.2)
BASOPHILS # BLD AUTO: 0.03 X10(3) UL (ref 0–0.2)
BASOPHILS NFR BLD AUTO: 0.4 %
BASOPHILS NFR BLD AUTO: 0.4 %
BILIRUB DIRECT SERPL-MCNC: 0.2 MG/DL (ref 0–0.2)
BILIRUB SERPL-MCNC: 0.6 MG/DL (ref 0.1–2)
BILIRUB SERPL-MCNC: 0.6 MG/DL (ref 0.1–2)
BILIRUB UR QL: NEGATIVE
BUN BLD-MCNC: 69 MG/DL (ref 7–18)
BUN BLD-MCNC: 73 MG/DL (ref 7–18)
BUN/CREAT SERPL: 35.8 (ref 10–20)
BUN/CREAT SERPL: 38.8 (ref 10–20)
CALCIUM BLD-MCNC: 8.3 MG/DL (ref 8.5–10.1)
CALCIUM BLD-MCNC: 8.5 MG/DL (ref 8.5–10.1)
CHLORIDE SERPL-SCNC: 105 MMOL/L (ref 98–112)
CHLORIDE SERPL-SCNC: 108 MMOL/L (ref 98–112)
CLARITY UR: CLEAR
CO2 SERPL-SCNC: 23 MMOL/L (ref 21–32)
CO2 SERPL-SCNC: 26 MMOL/L (ref 21–32)
COLOR UR: YELLOW
CREAT BLD-MCNC: 1.78 MG/DL
CREAT BLD-MCNC: 2.04 MG/DL
DEPRECATED RDW RBC AUTO: 42.8 FL (ref 35.1–46.3)
DEPRECATED RDW RBC AUTO: 44 FL (ref 35.1–46.3)
EOSINOPHIL # BLD AUTO: 0.13 X10(3) UL (ref 0–0.7)
EOSINOPHIL # BLD AUTO: 0.16 X10(3) UL (ref 0–0.7)
EOSINOPHIL NFR BLD AUTO: 1.8 %
EOSINOPHIL NFR BLD AUTO: 2 %
ERYTHROCYTE [DISTWIDTH] IN BLOOD BY AUTOMATED COUNT: 11.7 % (ref 11–15)
ERYTHROCYTE [DISTWIDTH] IN BLOOD BY AUTOMATED COUNT: 11.8 % (ref 11–15)
GLOBULIN PLAS-MCNC: 3.4 G/DL (ref 2.8–4.4)
GLUCOSE BLD-MCNC: 137 MG/DL (ref 70–99)
GLUCOSE BLD-MCNC: 143 MG/DL (ref 70–99)
GLUCOSE BLDC GLUCOMTR-MCNC: 147 MG/DL (ref 70–99)
GLUCOSE BLDC GLUCOMTR-MCNC: 148 MG/DL (ref 70–99)
GLUCOSE UR-MCNC: 50 MG/DL
HAV IGM SER QL: 2.1 MG/DL (ref 1.6–2.6)
HAV IGM SER QL: 2.2 MG/DL (ref 1.6–2.6)
HCT VFR BLD AUTO: 29.1 %
HCT VFR BLD AUTO: 29.6 %
HGB BLD-MCNC: 9.3 G/DL
HGB BLD-MCNC: 9.5 G/DL
HGB UR QL STRIP.AUTO: NEGATIVE
HYALINE CASTS #/AREA URNS AUTO: PRESENT /LPF
IMM GRANULOCYTES # BLD AUTO: 0.02 X10(3) UL (ref 0–1)
IMM GRANULOCYTES # BLD AUTO: 0.03 X10(3) UL (ref 0–1)
IMM GRANULOCYTES NFR BLD: 0.3 %
IMM GRANULOCYTES NFR BLD: 0.4 %
INR BLD: 1.22 (ref 0.9–1.2)
KETONES UR-MCNC: NEGATIVE MG/DL
LACTATE SERPL-SCNC: 1 MMOL/L (ref 0.4–2)
LACTATE SERPL-SCNC: 1.3 MMOL/L (ref 0.4–2)
LACTATE SERPL-SCNC: 1.7 MMOL/L (ref 0.4–2)
LEUKOCYTE ESTERASE UR QL STRIP.AUTO: NEGATIVE
LIPASE SERPL-CCNC: 117 U/L (ref 73–393)
LYMPHOCYTES # BLD AUTO: 1.26 X10(3) UL (ref 1–4)
LYMPHOCYTES # BLD AUTO: 1.28 X10(3) UL (ref 1–4)
LYMPHOCYTES NFR BLD AUTO: 16 %
LYMPHOCYTES NFR BLD AUTO: 17.3 %
M PROTEIN MFR SERPL ELPH: 6.4 G/DL (ref 6.4–8.2)
M PROTEIN MFR SERPL ELPH: 6.7 G/DL (ref 6.4–8.2)
MCH RBC QN AUTO: 32.5 PG (ref 26–34)
MCH RBC QN AUTO: 32.6 PG (ref 26–34)
MCHC RBC AUTO-ENTMCNC: 31.4 G/DL (ref 31–37)
MCHC RBC AUTO-ENTMCNC: 32.6 G/DL (ref 31–37)
MCV RBC AUTO: 100 FL
MCV RBC AUTO: 103.5 FL
MONOCYTES # BLD AUTO: 0.66 X10(3) UL (ref 0.1–1)
MONOCYTES # BLD AUTO: 0.7 X10(3) UL (ref 0.1–1)
MONOCYTES NFR BLD AUTO: 8.8 %
MONOCYTES NFR BLD AUTO: 9.1 %
NEUTROPHILS # BLD AUTO: 5.17 X10 (3) UL (ref 1.5–7.7)
NEUTROPHILS # BLD AUTO: 5.17 X10(3) UL (ref 1.5–7.7)
NEUTROPHILS # BLD AUTO: 5.78 X10 (3) UL (ref 1.5–7.7)
NEUTROPHILS # BLD AUTO: 5.78 X10(3) UL (ref 1.5–7.7)
NEUTROPHILS NFR BLD AUTO: 71.1 %
NEUTROPHILS NFR BLD AUTO: 72.4 %
NITRITE UR QL STRIP.AUTO: NEGATIVE
OSMOLALITY SERPL CALC.SUM OF ELEC: 312 MOSM/KG (ref 275–295)
OSMOLALITY SERPL CALC.SUM OF ELEC: 313 MOSM/KG (ref 275–295)
PH UR: 6 [PH] (ref 5–8)
PHOSPHATE SERPL-MCNC: 2.9 MG/DL (ref 2.5–4.9)
PHOSPHATE SERPL-MCNC: 3.4 MG/DL (ref 2.5–4.9)
PLATELET # BLD AUTO: 109 10(3)UL (ref 150–450)
PLATELET # BLD AUTO: 121 10(3)UL (ref 150–450)
POTASSIUM SERPL-SCNC: 4 MMOL/L (ref 3.5–5.1)
POTASSIUM SERPL-SCNC: 4.2 MMOL/L (ref 3.5–5.1)
PROT UR-MCNC: 30 MG/DL
PROTHROMBIN TIME: 15.2 SECONDS (ref 11.8–14.5)
RBC # BLD AUTO: 2.86 X10(6)UL
RBC # BLD AUTO: 2.91 X10(6)UL
RH BLOOD TYPE: POSITIVE
SODIUM SERPL-SCNC: 139 MMOL/L (ref 136–145)
SODIUM SERPL-SCNC: 140 MMOL/L (ref 136–145)
SP GR UR STRIP: 1.01 (ref 1–1.03)
UROBILINOGEN UR STRIP-ACNC: <2
WBC # BLD AUTO: 7.3 X10(3) UL (ref 4–11)
WBC # BLD AUTO: 8 X10(3) UL (ref 4–11)

## 2021-08-15 PROCEDURE — 74019 RADEX ABDOMEN 2 VIEWS: CPT | Performed by: SURGERY

## 2021-08-15 PROCEDURE — 99223 1ST HOSP IP/OBS HIGH 75: CPT | Performed by: HOSPITALIST

## 2021-08-15 RX ORDER — HYDRALAZINE HYDROCHLORIDE 20 MG/ML
10 INJECTION INTRAMUSCULAR; INTRAVENOUS EVERY 4 HOURS PRN
Status: DISCONTINUED | OUTPATIENT
Start: 2021-08-15 | End: 2021-08-20

## 2021-08-15 RX ORDER — FAMOTIDINE 10 MG/ML
20 INJECTION, SOLUTION INTRAVENOUS 2 TIMES DAILY
Status: DISCONTINUED | OUTPATIENT
Start: 2021-08-15 | End: 2021-08-15

## 2021-08-15 RX ORDER — ONDANSETRON 2 MG/ML
8 INJECTION INTRAMUSCULAR; INTRAVENOUS EVERY 6 HOURS PRN
Status: DISCONTINUED | OUTPATIENT
Start: 2021-08-15 | End: 2021-08-20

## 2021-08-15 RX ORDER — SODIUM CHLORIDE 9 MG/ML
INJECTION, SOLUTION INTRAVENOUS CONTINUOUS
Status: DISCONTINUED | OUTPATIENT
Start: 2021-08-15 | End: 2021-08-16

## 2021-08-15 RX ORDER — MORPHINE SULFATE 4 MG/ML
4 INJECTION, SOLUTION INTRAMUSCULAR; INTRAVENOUS EVERY 2 HOUR PRN
Status: DISCONTINUED | OUTPATIENT
Start: 2021-08-15 | End: 2021-08-20

## 2021-08-15 RX ORDER — ONDANSETRON 2 MG/ML
4 INJECTION INTRAMUSCULAR; INTRAVENOUS EVERY 6 HOURS PRN
Status: DISCONTINUED | OUTPATIENT
Start: 2021-08-15 | End: 2021-08-20

## 2021-08-15 RX ORDER — MORPHINE SULFATE 2 MG/ML
3 INJECTION, SOLUTION INTRAMUSCULAR; INTRAVENOUS EVERY 2 HOUR PRN
Status: DISCONTINUED | OUTPATIENT
Start: 2021-08-15 | End: 2021-08-20

## 2021-08-15 RX ORDER — MORPHINE SULFATE 2 MG/ML
2 INJECTION, SOLUTION INTRAMUSCULAR; INTRAVENOUS EVERY 2 HOUR PRN
Status: DISCONTINUED | OUTPATIENT
Start: 2021-08-15 | End: 2021-08-20

## 2021-08-15 RX ORDER — HEPARIN SODIUM 1000 [USP'U]/ML
80 INJECTION, SOLUTION INTRAVENOUS; SUBCUTANEOUS ONCE
Status: COMPLETED | OUTPATIENT
Start: 2021-08-15 | End: 2021-08-15

## 2021-08-15 RX ORDER — DEXTROSE, SODIUM CHLORIDE, AND POTASSIUM CHLORIDE 5; .45; .15 G/100ML; G/100ML; G/100ML
INJECTION INTRAVENOUS CONTINUOUS
Status: DISCONTINUED | OUTPATIENT
Start: 2021-08-15 | End: 2021-08-15

## 2021-08-15 RX ORDER — HEPARIN SODIUM AND DEXTROSE 10000; 5 [USP'U]/100ML; G/100ML
INJECTION INTRAVENOUS CONTINUOUS
Status: DISCONTINUED | OUTPATIENT
Start: 2021-08-15 | End: 2021-08-17

## 2021-08-15 RX ORDER — HEPARIN SODIUM AND DEXTROSE 10000; 5 [USP'U]/100ML; G/100ML
18 INJECTION INTRAVENOUS ONCE
Status: COMPLETED | OUTPATIENT
Start: 2021-08-15 | End: 2021-08-15

## 2021-08-15 NOTE — PROGRESS NOTES
[facility] Pharmacy Note: Antimicrobial Weight Based Dose Adjustment for: piperacillin/tazobactam (Jasmin Braden)    Merian Hodgkin is a 68year old patient who has been prescribed piperacillin/tazobactam (ZOSYN) 3.375 gm ivpb x 1 dose ER.     Estimated Creatinine Cleara

## 2021-08-15 NOTE — ED QUICK NOTES
Pt refusing NG tube insertion. Explained the risks of not having NG tube insertion, which include worsening of condition and death, and pt still refusing. Provider notified.

## 2021-08-15 NOTE — ED QUICK NOTES
Pt refusing cardiac monitor. Verbally abusive to er staff. Awaiting icu bed, will continue to monitor.

## 2021-08-15 NOTE — ED QUICK NOTES
Resting on cart in no apparent distress. Call light within reach, awaiting icu admission/bed, will continue to monitor.

## 2021-08-15 NOTE — PROGRESS NOTES
[facility] Pharmacy Note: Antimicrobial Weight Based Dose Adjustment for: piperacillin/tazobactam (Ann Mariegisela Alvarado)    Latonia Waddell is a 68year old patient who has been prescribed piperacillin/tazobactam (ZOSYN) 3.375g every 8 hours.     Estimated Creatinine Clearance:

## 2021-08-15 NOTE — ED QUICK NOTES
Rounding Completed    Plan of Care reviewed. Waiting for bed  Elimination needs assessed. Provided assist to BR    Bed is locked and in lowest position. Call light within reach.

## 2021-08-15 NOTE — ED QUICK NOTES
Orders for admission, patient is aware of plan and ready to go upstairs. Any questions, please call ED RN Merlin Martinez  at extension [phone].    Type of COVID test sent:Rapid  COVID Suspicion level: Low    Titratable drug(s) infusing:n/a  Rate:n/a    LOC at time

## 2021-08-15 NOTE — H&P
[facility] Patient Status:  Emergency    1945 MRN [id number]   Location [address] Attending No att. providers found   Hosp Day # 0 PCP Nelsy Nieto     Date:   mg by mouth daily. Pentoxifylline  MG Oral Tab CR   Yes No   Sig: Take 1 tablet (400 mg total) by mouth 2 (two) times a day. Potassium Chloride ER 20 MEQ Oral Tab CR   Yes No   Sig: Take 20 mEq by mouth daily.    Pyridoxine HCl (VITAMIN B-6, PYRID intact, Normal conjunctiva. HENT:  Normocephalic, oral mucosa is moist.  Head:  Normocephalic, atraumatic. Neck:  Supple, non-tender, no carotid bruit, no jugular venous distention, no lymphadenopathy, no thyromegaly.   Respiratory:  Lungs are clear to au coverage as needed, last A1c 7.3    Atrial fibrillation  Rate control, hold anticoagulation for now.     Prophylaxis  SCDs, heparin on hold till patient evaluated by surgery    CODE STATUS  Full    Primary care physician  Gustavo Deshpande

## 2021-08-15 NOTE — ED INITIAL ASSESSMENT (HPI)
Pt arrived per EMS from [facility]. Pt states nausea and vomiting x 2 today, diarrhea. Pt states has been able to eat and drink today. Denies chest pain or fevers.

## 2021-08-15 NOTE — PROGRESS NOTES
Received report from ER. Patient brought by cart, transport and ER RN. VS as charted. Patient overly sexual with comments to this RN. Patient cooperative at times. Orders released and IVF started. Dr Steven An bedside. Labs drawn. Patient oriented X2-3.

## 2021-08-15 NOTE — CONSULTS
[facility]    Report of Consultation    Debbi Patel Patient Status:  Inpatient    1945 MRN [id number]   Location [facility] 2W/SW Attending Oliver Stanton,    Hosp Day # 0 PCP Zuri Room     Date of Admission:   No pertinent family history. reports that he has never smoked. He has never used smokeless tobacco. He reports current drug use. Drugs: Cannabis and Cocaine. He reports that he does not drink alcohol.     Allergies:    Metformin               DIARRHEA extremities normal, atraumatic, no cyanosis or edema  Skin: Skin color, texture, turgor normal. No rashes or lesions  Psychiatric: calm  Patient is not able to give a detailed history    Results:  Lab Results   Component Value Date    WBC 7.9 08/14/2021 patient's provider, WANG Lamar, by the on-call teleradiologist Dr. Trinh Sofia at 00:25 on 15/21. A preliminary report was issued by the [address] [place] [address] Radiology teleradiology service. There are no major discrepancies.   Dictated by (CST): Femi Rosario the friends are listed in the computer to see if they are able to make any decisions for him. I do not feel the patient is able to make any decisions due to this confusion with likely sepsis.   If no one is able to make any decisions patient will require l as well as Dr. Jas Regalado. We will follow closely. No acute surgical intervention at this time. Continue PCU status. We will keep n.p.o. at this time. Patient is refusing NG tube. Patient was refusing Medina catheter but will be on strict I's and O's.     Kellie Walker

## 2021-08-15 NOTE — PROGRESS NOTES
Call made to emergency contact Luis Bah. Message left for call back. Attempted to call second contact, Cassy Alejandro. That phone number is strait to [facility]. Was transferred to the CNA that takes care of him. No answer after two attempts.

## 2021-08-15 NOTE — ED PROVIDER NOTES
Patient Seen in: [facility] AND [facility] Emergency Department      History   Patient presents with:  Nausea/Vomiting/Diarrhea    Stated Complaint: n/v/d    HPI/Subjective:   77yo/m w hx of cellulitis, CKD, DM, HTN, Afib on Xarelto reports to the ED with compla HENT:      Head: Normocephalic and atraumatic. Eyes:      Conjunctiva/sclera: Conjunctivae normal.      Pupils: Pupils are equal, round, and reactive to light. Cardiovascular:      Rate and Rhythm: Normal rate and regular rhythm.       Heart sounds: N Abnormality         Status                     ---------                               -----------         ------                     CBC W/ DIFFERENTIAL[[id number]]          Abnormal            Final result                 Please view leukocytosis  ckd slightly bump in cr    Overall stable    CT as above    Concern for evolving SBO vs ischemic bowel        Dr. Yolanda Llamas;      Jacques, follow lactic, PCCU tonight    Concern for mesenteric ischemic, would like PCU                       Disp

## 2021-08-16 LAB
ALBUMIN SERPL-MCNC: 3.1 G/DL (ref 3.4–5)
ALP LIVER SERPL-CCNC: 111 U/L
ALT SERPL-CCNC: 38 U/L
ANION GAP SERPL CALC-SCNC: 6 MMOL/L (ref 0–18)
APTT PPP: 63.5 SECONDS (ref 23.2–35.3)
APTT PPP: >240 SECONDS (ref 23.2–35.3)
AST SERPL-CCNC: 29 U/L (ref 15–37)
BASOPHILS # BLD AUTO: 0.03 X10(3) UL (ref 0–0.2)
BASOPHILS NFR BLD AUTO: 0.4 %
BILIRUB DIRECT SERPL-MCNC: 0.2 MG/DL (ref 0–0.2)
BILIRUB SERPL-MCNC: 0.6 MG/DL (ref 0.1–2)
BUN BLD-MCNC: 48 MG/DL (ref 7–18)
BUN/CREAT SERPL: 32.7 (ref 10–20)
CALCIUM BLD-MCNC: 8.5 MG/DL (ref 8.5–10.1)
CHLORIDE SERPL-SCNC: 112 MMOL/L (ref 98–112)
CO2 SERPL-SCNC: 24 MMOL/L (ref 21–32)
CREAT BLD-MCNC: 1.47 MG/DL
DEPRECATED RDW RBC AUTO: 41.8 FL (ref 35.1–46.3)
EOSINOPHIL # BLD AUTO: 0.17 X10(3) UL (ref 0–0.7)
EOSINOPHIL NFR BLD AUTO: 2.3 %
ERYTHROCYTE [DISTWIDTH] IN BLOOD BY AUTOMATED COUNT: 11.6 % (ref 11–15)
GLUCOSE BLD-MCNC: 160 MG/DL (ref 70–99)
GLUCOSE BLDC GLUCOMTR-MCNC: 159 MG/DL (ref 70–99)
GLUCOSE BLDC GLUCOMTR-MCNC: 185 MG/DL (ref 70–99)
GLUCOSE BLDC GLUCOMTR-MCNC: 195 MG/DL (ref 70–99)
GLUCOSE BLDC GLUCOMTR-MCNC: 205 MG/DL (ref 70–99)
GLUCOSE BLDC GLUCOMTR-MCNC: 233 MG/DL (ref 70–99)
GLUCOSE BLDC GLUCOMTR-MCNC: 245 MG/DL (ref 70–99)
HAV IGM SER QL: 2.2 MG/DL (ref 1.6–2.6)
HCT VFR BLD AUTO: 30.8 %
HGB BLD-MCNC: 10.4 G/DL
IMM GRANULOCYTES # BLD AUTO: 0.03 X10(3) UL (ref 0–1)
IMM GRANULOCYTES NFR BLD: 0.4 %
LACTATE SERPL-SCNC: 0.8 MMOL/L (ref 0.4–2)
LYMPHOCYTES # BLD AUTO: 0.99 X10(3) UL (ref 1–4)
LYMPHOCYTES NFR BLD AUTO: 13.4 %
M PROTEIN MFR SERPL ELPH: 6.6 G/DL (ref 6.4–8.2)
MCH RBC QN AUTO: 32.8 PG (ref 26–34)
MCHC RBC AUTO-ENTMCNC: 33.8 G/DL (ref 31–37)
MCV RBC AUTO: 97.2 FL
MONOCYTES # BLD AUTO: 0.55 X10(3) UL (ref 0.1–1)
MONOCYTES NFR BLD AUTO: 7.4 %
NEUTROPHILS # BLD AUTO: 5.63 X10 (3) UL (ref 1.5–7.7)
NEUTROPHILS # BLD AUTO: 5.63 X10(3) UL (ref 1.5–7.7)
NEUTROPHILS NFR BLD AUTO: 76.1 %
OSMOLALITY SERPL CALC.SUM OF ELEC: 310 MOSM/KG (ref 275–295)
PHOSPHATE SERPL-MCNC: 2.6 MG/DL (ref 2.5–4.9)
PLATELET # BLD AUTO: 133 10(3)UL (ref 150–450)
POTASSIUM SERPL-SCNC: 3.8 MMOL/L (ref 3.5–5.1)
RBC # BLD AUTO: 3.17 X10(6)UL
SODIUM SERPL-SCNC: 142 MMOL/L (ref 136–145)
WBC # BLD AUTO: 7.4 X10(3) UL (ref 4–11)

## 2021-08-16 PROCEDURE — 99233 SBSQ HOSP IP/OBS HIGH 50: CPT | Performed by: HOSPITALIST

## 2021-08-16 RX ORDER — METOPROLOL TARTRATE 5 MG/5ML
5 INJECTION INTRAVENOUS EVERY 6 HOURS PRN
Status: DISCONTINUED | OUTPATIENT
Start: 2021-08-16 | End: 2021-08-20

## 2021-08-16 RX ORDER — DEXTROSE, SODIUM CHLORIDE, AND POTASSIUM CHLORIDE 5; .45; .15 G/100ML; G/100ML; G/100ML
INJECTION INTRAVENOUS CONTINUOUS
Status: DISCONTINUED | OUTPATIENT
Start: 2021-08-16 | End: 2021-08-18

## 2021-08-16 RX ORDER — POTASSIUM CHLORIDE 1500 MG/1
1 TABLET, FILM COATED, EXTENDED RELEASE ORAL DAILY
Status: ON HOLD | COMMUNITY
End: 2021-08-20

## 2021-08-16 RX ORDER — LOPERAMIDE HYDROCHLORIDE 2 MG/1
2 CAPSULE ORAL 4 TIMES DAILY PRN
Status: ON HOLD | COMMUNITY
End: 2021-08-20

## 2021-08-16 RX ORDER — METOPROLOL TARTRATE 5 MG/5ML
5 INJECTION INTRAVENOUS EVERY 6 HOURS
Status: DISCONTINUED | OUTPATIENT
Start: 2021-08-16 | End: 2021-08-16

## 2021-08-16 NOTE — PROGRESS NOTES
[facility]    Progress Note    Fabiana Correa Patient Status:  Inpatient    1945 MRN [id number]   Location [facility] 2W/SW Attending Nishant Guerrero, [address] Day # 1 PCP WILNER Garcia       Subjective:   Fabiana Correa i Date    INR 1.22 (H) 08/15/2021       Recent Labs   Lab 08/15/21  1014 08/15/21  1358 08/16/21  0301   * 143* 160*   BUN 73* 69* 48*   CREATSERUM 2.04* 1.78* 1.47*   GFRAA 36* 42* 53*   GFRNAA 31* 36* 46*   CA 8.3* 8.5 8.5   ALB 3.2* 3.0* 3.1*   NA (CPT=71045)    Result Date: 8/15/2021  CONCLUSION:  1. Stable cardiomegaly. 2. No acute pulmonary process. A preliminary report was issued by the [address] [place] [address] Radiology teleradiology service. There are no major discrepancies.    Dictated by (CST): Dia Guo

## 2021-08-16 NOTE — PAYOR COMM NOTE
--------------  ADMISSION REVIEW     Payor: Hari Anand #:  [id number]  Authorization Number: [id number]    Admit date: 8/15/21  Admit time:  9:21 AM       REVIEW DOCUMENTATION:           ED Provider Notes      ED Provider Note He is not in acute distress. Appearance: He is well-developed. HENT:      Head: Normocephalic and atraumatic. Eyes:      Conjunctiva/sclera: Conjunctivae normal.      Pupils: Pupils are equal, round, and reactive to light.    Cardiovascular:      Ra 9491  ------------------------------------------------------------  Time: 08/15 0040  Comment: Spoke with Dr. Prosper Mason who will admit          MPRESSION    There is moderate air within the mesenteric veins and trace portal venous air within the caudate lobe. Unknown                      H&P - H&P Note      H&P signed by Betsy Dubon MD at 8/15/2021  8:15 AM     Author: Betsy Dubon MD Service: Willis Vera Author Type: Physician    Filed: 8/15/2021  8:15 AM Date of Service: 8/15/2021  3:04 AM Status: Signed    Ed AM      Electronically signed by Yomaira Jeter MD on 8/15/2021  8:15 AM               REVIEW DOCUMENTATION:  8-16-21 08/16/21 0800 97.7 °F (36.5 °C) 80 14 140/71 95 % — None (Room air) — LG   08/16/21 0700 — — — — — — None (Room air) — MW   08/16/ JVD and supple, symmetrical, trachea midline  Pulmonary: No labored breathing, equal respiratory excursion  Cardiovascular: regular rate and rhythm  Abdominal: soft, non-tender; bowel sounds normal; no masses,  no organomegaly and no flatus, no BM  Extremi

## 2021-08-16 NOTE — PLAN OF CARE
Patient alert and oriented x3-4, forgetful/confused at times. Started on clear liquid diet today, tolerating well. Denies nausea. Denies pain. Heparin drip in place for history of A fib. Continuing IV fluids and antibiotics. Call light in reach.  Frequent r tolerated  - Nasogastric tube to low intermittent suction as ordered  - Evaluate effectiveness of ordered antiemetic medications  - Provide nonpharmacologic comfort measures as appropriate  - Advance diet as tolerated, if ordered  - Obtain nutritional cons

## 2021-08-16 NOTE — PROGRESS NOTES
[place] [facility]    Progress Note    Christopher Rojas Patient Status:  Inpatient    1945 MRN [id number]   Location [facility] 4W/SW/SE Attending Alfredo Rodgers, [address] [facility] Day # 1 PCP WILNER Foss       Subjective:   Christopher Rojas HCT 30.8 (L) 08/16/2021    .0 (L) 08/16/2021    CREATSERUM 1.47 (H) 08/16/2021    BUN 48 (H) 08/16/2021     08/16/2021    K 3.8 08/16/2021     08/16/2021    CO2 24.0 08/16/2021     (H) 08/16/2021    CA 8.5 08/16/2021    ALB 3.1 (L There are no major discrepancies.   Dictated by (CST): Neri Regalado MD on 8/15/2021 at 10:07 AM     Finalized by (CST): Neri Regalado MD on 8/15/2021 at 10:19 AM          XR CHEST AP PORTABLE  (CPT=71045)    Result Date: 8/15/2021  CONCLUSION:  1 23.0 24.0             Assessment and Plan:        Assessment and Plan:     Possible ischemic bowel  CT scan suggestive of pneumatosis, lactate however reassuring. Surgery consulted - apprec input. Discussed with Dr Margaret Boone. Pt better today. Bibiana Marie for CLD.

## 2021-08-16 NOTE — PLAN OF CARE
Pt alert and orientated with no complaints of stomach discomfort. No BM, but is passing gas. Titrating Heparin per protocol. Slept well with call light within reach and hourly nursing rounding.     Problem: Diabetes/Glucose Control  Goal: Glucose maintained

## 2021-08-17 ENCOUNTER — APPOINTMENT (OUTPATIENT)
Dept: CT IMAGING | Facility: HOSPITAL | Age: 76
DRG: 393 | End: 2021-08-17
Attending: Other
Payer: MEDICARE

## 2021-08-17 ENCOUNTER — APPOINTMENT (OUTPATIENT)
Dept: CT IMAGING | Facility: HOSPITAL | Age: 76
DRG: 393 | End: 2021-08-17
Attending: SURGERY
Payer: MEDICARE

## 2021-08-17 LAB
ALBUMIN SERPL-MCNC: 3 G/DL (ref 3.4–5)
ALP LIVER SERPL-CCNC: 103 U/L
ALT SERPL-CCNC: 30 U/L
ANION GAP SERPL CALC-SCNC: 6 MMOL/L (ref 0–18)
APTT PPP: 117.6 SECONDS (ref 23.2–35.3)
APTT PPP: 154.9 SECONDS (ref 23.2–35.3)
AST SERPL-CCNC: 26 U/L (ref 15–37)
BASOPHILS # BLD AUTO: 0.02 X10(3) UL (ref 0–0.2)
BASOPHILS NFR BLD AUTO: 0.3 %
BILIRUB DIRECT SERPL-MCNC: 0.2 MG/DL (ref 0–0.2)
BILIRUB SERPL-MCNC: 0.7 MG/DL (ref 0.1–2)
BUN BLD-MCNC: 30 MG/DL (ref 7–18)
BUN/CREAT SERPL: 21.3 (ref 10–20)
CALCIUM BLD-MCNC: 8.4 MG/DL (ref 8.5–10.1)
CHLORIDE SERPL-SCNC: 110 MMOL/L (ref 98–112)
CO2 SERPL-SCNC: 22 MMOL/L (ref 21–32)
CREAT BLD-MCNC: 1.41 MG/DL
DEPRECATED RDW RBC AUTO: 42.3 FL (ref 35.1–46.3)
EOSINOPHIL # BLD AUTO: 0.16 X10(3) UL (ref 0–0.7)
EOSINOPHIL NFR BLD AUTO: 2.2 %
ERYTHROCYTE [DISTWIDTH] IN BLOOD BY AUTOMATED COUNT: 11.7 % (ref 11–15)
GLUCOSE BLD-MCNC: 196 MG/DL (ref 70–99)
GLUCOSE BLDC GLUCOMTR-MCNC: 206 MG/DL (ref 70–99)
GLUCOSE BLDC GLUCOMTR-MCNC: 213 MG/DL (ref 70–99)
GLUCOSE BLDC GLUCOMTR-MCNC: 226 MG/DL (ref 70–99)
HAV IGM SER QL: 1.9 MG/DL (ref 1.6–2.6)
HCT VFR BLD AUTO: 29.6 %
HGB BLD-MCNC: 9.7 G/DL
IMM GRANULOCYTES # BLD AUTO: 0.02 X10(3) UL (ref 0–1)
IMM GRANULOCYTES NFR BLD: 0.3 %
LYMPHOCYTES # BLD AUTO: 1.22 X10(3) UL (ref 1–4)
LYMPHOCYTES NFR BLD AUTO: 16.9 %
M PROTEIN MFR SERPL ELPH: 6.8 G/DL (ref 6.4–8.2)
MCH RBC QN AUTO: 32.8 PG (ref 26–34)
MCHC RBC AUTO-ENTMCNC: 32.8 G/DL (ref 31–37)
MCV RBC AUTO: 100 FL
MONOCYTES # BLD AUTO: 0.74 X10(3) UL (ref 0.1–1)
MONOCYTES NFR BLD AUTO: 10.3 %
NEUTROPHILS # BLD AUTO: 5.04 X10 (3) UL (ref 1.5–7.7)
NEUTROPHILS # BLD AUTO: 5.04 X10(3) UL (ref 1.5–7.7)
NEUTROPHILS NFR BLD AUTO: 70 %
OSMOLALITY SERPL CALC.SUM OF ELEC: 298 MOSM/KG (ref 275–295)
PHOSPHATE SERPL-MCNC: 2 MG/DL (ref 2.5–4.9)
PLATELET # BLD AUTO: 124 10(3)UL (ref 150–450)
PLATELET # BLD AUTO: 124 10(3)UL (ref 150–450)
POTASSIUM SERPL-SCNC: 4.1 MMOL/L (ref 3.5–5.1)
RBC # BLD AUTO: 2.96 X10(6)UL
SODIUM SERPL-SCNC: 138 MMOL/L (ref 136–145)
WBC # BLD AUTO: 7.2 X10(3) UL (ref 4–11)

## 2021-08-17 PROCEDURE — 99233 SBSQ HOSP IP/OBS HIGH 50: CPT | Performed by: HOSPITALIST

## 2021-08-17 PROCEDURE — 99223 1ST HOSP IP/OBS HIGH 75: CPT | Performed by: OTHER

## 2021-08-17 PROCEDURE — 70498 CT ANGIOGRAPHY NECK: CPT | Performed by: OTHER

## 2021-08-17 PROCEDURE — 70450 CT HEAD/BRAIN W/O DYE: CPT | Performed by: OTHER

## 2021-08-17 PROCEDURE — 74177 CT ABD & PELVIS W/CONTRAST: CPT | Performed by: SURGERY

## 2021-08-17 PROCEDURE — 70496 CT ANGIOGRAPHY HEAD: CPT | Performed by: OTHER

## 2021-08-17 RX ORDER — PENTOXIFYLLINE 400 MG/1
400 TABLET, EXTENDED RELEASE ORAL 2 TIMES DAILY
Status: DISCONTINUED | OUTPATIENT
Start: 2021-08-17 | End: 2021-08-17

## 2021-08-17 RX ORDER — ASPIRIN 81 MG/1
81 TABLET, CHEWABLE ORAL DAILY
Status: DISCONTINUED | OUTPATIENT
Start: 2021-08-17 | End: 2021-08-20

## 2021-08-17 RX ORDER — MULTIVITAMIN WITH IRON
100 TABLET ORAL DAILY
Status: DISCONTINUED | OUTPATIENT
Start: 2021-08-17 | End: 2021-08-20

## 2021-08-17 RX ORDER — FUROSEMIDE 10 MG/ML
40 INJECTION INTRAMUSCULAR; INTRAVENOUS ONCE
Status: DISCONTINUED | OUTPATIENT
Start: 2021-08-17 | End: 2021-08-17

## 2021-08-17 RX ORDER — TAMSULOSIN HYDROCHLORIDE 0.4 MG/1
0.4 CAPSULE ORAL NIGHTLY
Status: DISCONTINUED | OUTPATIENT
Start: 2021-08-17 | End: 2021-08-20

## 2021-08-17 RX ORDER — FINASTERIDE 5 MG/1
5 TABLET, FILM COATED ORAL NIGHTLY
Status: DISCONTINUED | OUTPATIENT
Start: 2021-08-17 | End: 2021-08-20

## 2021-08-17 RX ORDER — ATORVASTATIN CALCIUM 40 MG/1
40 TABLET, FILM COATED ORAL DAILY
Status: DISCONTINUED | OUTPATIENT
Start: 2021-08-17 | End: 2021-08-20

## 2021-08-17 RX ORDER — OXYBUTYNIN CHLORIDE 5 MG/1
5 TABLET, EXTENDED RELEASE ORAL DAILY
Status: DISCONTINUED | OUTPATIENT
Start: 2021-08-17 | End: 2021-08-20

## 2021-08-17 NOTE — CONSULTS
Reason for Consultation: NSVT, HFpEF    Assessment/Plan:     1. N/V/D  - possible ischemic bowel  2. HFpEF  - Echo 5/22/2021: EF 55-60%; CVP-3; RVSP-21  3. AIVR       PLAN:  - not sure when/where diagnosis of AF was made.   - presently SR with AIVR  - will Daily  tamsulosin HCl (FLOMAX) cap 0.4 mg, 0.4 mg, Oral, Nightly  Vitamin D3 (Cholecalciferol) cap/tab 1,000 Units, 1,000 Units, Oral, Daily  metoprolol Tartrate (LOPRESSOR) injection 5 mg, 5 mg, Intravenous, Q6H PRN  dextrose 5 % and 0.45 % NaCl with KCl MS:adequate gait for exercise testing. EXT:no clubbing or cyanosis. SKIN:no rashes,lesions. NEURO/PSYCH:alert and oriented. Normal affect. CV:   PALP:PMI not displaced; no lifts, thrills or rubs.    AUSC:regular rhythm; normal S1, S2 without S3; no development of trace bilateral pleural effusions suggesting mild CHF/fluid overload. 5. No biliary ductal dilatation in this post cholecystectomy patient. 6. Stable small fat containing midline supraumbilical and small left inguinal hernias.   No bowel loop (CST): Norma Pickett MD on 8/17/2021 at 2:11 PM     Finalized by (CST): Norma Pickett MD on 8/17/2021 at 2:25 PM                 Thank you for allowing me to participate in the care of your patient.        *This note was dictated, in part, using a compu

## 2021-08-17 NOTE — PROGRESS NOTES
[place] [facility] - [facility]    Progress Note    Lidya Weir Patient Status:  Inpatient    1945 MRN [id number]   Location [facility] 4W/SW/SE Attending Dennis Wellington, [address] [facility] Day # 2 PCP WILNER Lucio       Subjective:   Lidya Weir 200-3,000 Units/hr, Intravenous, Continuous        Lab Results   Component Value Date    WBC 7.2 08/17/2021    HGB 9.7 (L) 08/17/2021    HCT 29.6 (L) 08/17/2021    .0 (L) 08/17/2021    .0 (L) 08/17/2021    CREATSERUM 1.41 (H) 08/17/2021    BU supraumbilical and small left inguinal hernias. No bowel loop involvement. 7. Lesser incidental findings as above.     Dictated by (CST): Tacos Reyes MD on 8/17/2021 at 12:07 PM     Finalized by (CST): Tacos Reyes MD on 8/17/2021 at 12:22 PM -------------------------- Sinus Rhythm rsR WITHIN NORMAL LIMITS When compared with ECG of 08/14/2021 21:55:29 Changes of anteroseptal MI not present Electronically signed on 08/17/2021 at 15:20 by Penny Cuevas MD      Results:     CBC:    Lab Results patient, discussing plan of care, discussing labs and imaging findings. Spoke with consultant. All questions answered.

## 2021-08-17 NOTE — CM/SW NOTE
ELINA received a phone call from [address] [facility] [address] [phone] at Dr. Shirin Jacobs office. Reports that [facility] has been attempting to place patient at [facility] as his needs exceed care. ELINA notified Tara Carter that this will have to be consented by patient.

## 2021-08-17 NOTE — PROGRESS NOTES
[place] [facility] - [facility]    Progress Note    Ana Webb Patient Status:  Inpatient    1945 MRN [id number]   Location [facility] 2W/SW Attending Dunia Lim, [address] [facility] Day # 2 PCP WILNER Yi       Subjective:   Ana Webb i 8.5 8.4*   ALB 3.0* 3.1* 3.0*    142 138   K 4.0 3.8 4.1    112 110   CO2 23.0 24.0 22.0   ALKPHO 116 111 103   AST 29 29 26   ALT 40 38 30   BILT 0.6 0.6 0.7   TP 6.4 6.6 6.8   KINA 38  --   --              XR ABDOMEN OBSTRUCTIVE SERIES ROUTINE

## 2021-08-17 NOTE — PHYSICAL THERAPY NOTE
PHYSICAL THERAPY EVALUATION - INPATIENT     Room Number: 465/465-A  Evaluation Date: 8/17/2021  Type of Evaluation: Initial   Physician Order: PT Eval and Treat    Presenting Problem: pneumatosis of intestines  Reason for Therapy: Mobility Dysfunction and Impairment: 72.57% has been calculated based on documentation in the [facility] '6 clicks' Inpatient Basic Mobility Short Form. Research supports that patients with this level of impairment may benefit from sub-acute rehab to optimize functional outcomes.     P RESTRICTION  Weight Bearing Restriction: None                PAIN ASSESSMENT  Rating: Unable to rate  Location: sensitivity to light touch bilateral LE's and LUE  Management Techniques: Activity promotion; Body mechanics;Repositioning    COGNITION  · Overal 2)  Distance (ft): 2 side steps toward [facility]  Assistive Device: Rolling walker  Pattern:  (flexed posture)  Stoop/Curb Assistance: Not tested     Exercise/Education Provided:  Bed mobility  Body mechanics  Energy conservation  Functional activity tolerated

## 2021-08-17 NOTE — PLAN OF CARE
Problem: Patient Centered Care  Goal: Patient preferences are identified and integrated in the patient's plan of care  Description: Interventions:  - What would you like us to know as we care for you?  I live at [facility]  - Provide timely, complete, balance  Outcome: Progressing  Goal: Maintains or returns to baseline bowel function  Description: INTERVENTIONS:  - Assess bowel function  - Maintain adequate hydration with IV or PO as ordered and tolerated  - Evaluate effectiveness of GI medications  - resources  Description: INTERVENTIONS:  - Identify barriers to discharge w/pt and caregiver  - Include patient/family/discharge partner in discharge planning  - Arrange for needed discharge resources and transportation as appropriate  - Identify discharge
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
## 2021-08-18 PROBLEM — F32.2 MAJOR DEPRESSIVE DISORDER, SINGLE EPISODE, SEVERE (HCC): Status: ACTIVE | Noted: 2021-08-18

## 2021-08-18 LAB
ANION GAP SERPL CALC-SCNC: 5 MMOL/L (ref 0–18)
BASOPHILS # BLD AUTO: 0.02 X10(3) UL (ref 0–0.2)
BASOPHILS NFR BLD AUTO: 0.3 %
BUN BLD-MCNC: 21 MG/DL (ref 7–18)
BUN/CREAT SERPL: 15 (ref 10–20)
CALCIUM BLD-MCNC: 8.5 MG/DL (ref 8.5–10.1)
CHLORIDE SERPL-SCNC: 109 MMOL/L (ref 98–112)
CO2 SERPL-SCNC: 25 MMOL/L (ref 21–32)
CREAT BLD-MCNC: 1.4 MG/DL
DEPRECATED RDW RBC AUTO: 43.3 FL (ref 35.1–46.3)
EOSINOPHIL # BLD AUTO: 0.19 X10(3) UL (ref 0–0.7)
EOSINOPHIL NFR BLD AUTO: 2.8 %
ERYTHROCYTE [DISTWIDTH] IN BLOOD BY AUTOMATED COUNT: 11.9 % (ref 11–15)
GLUCOSE BLD-MCNC: 212 MG/DL (ref 70–99)
GLUCOSE BLDC GLUCOMTR-MCNC: 177 MG/DL (ref 70–99)
GLUCOSE BLDC GLUCOMTR-MCNC: 216 MG/DL (ref 70–99)
GLUCOSE BLDC GLUCOMTR-MCNC: 218 MG/DL (ref 70–99)
HAV IGM SER QL: 1.9 MG/DL (ref 1.6–2.6)
HCT VFR BLD AUTO: 29.5 %
HGB BLD-MCNC: 9.6 G/DL
IMM GRANULOCYTES # BLD AUTO: 0.01 X10(3) UL (ref 0–1)
IMM GRANULOCYTES NFR BLD: 0.1 %
INR BLD: 1.17 (ref 0.9–1.2)
LYMPHOCYTES # BLD AUTO: 1.13 X10(3) UL (ref 1–4)
LYMPHOCYTES NFR BLD AUTO: 16.4 %
MCH RBC QN AUTO: 32.7 PG (ref 26–34)
MCHC RBC AUTO-ENTMCNC: 32.5 G/DL (ref 31–37)
MCV RBC AUTO: 100.3 FL
MONOCYTES # BLD AUTO: 0.63 X10(3) UL (ref 0.1–1)
MONOCYTES NFR BLD AUTO: 9.1 %
NEUTROPHILS # BLD AUTO: 4.91 X10 (3) UL (ref 1.5–7.7)
NEUTROPHILS # BLD AUTO: 4.91 X10(3) UL (ref 1.5–7.7)
NEUTROPHILS NFR BLD AUTO: 71.3 %
OSMOLALITY SERPL CALC.SUM OF ELEC: 297 MOSM/KG (ref 275–295)
PHOSPHATE SERPL-MCNC: 2.8 MG/DL (ref 2.5–4.9)
PLATELET # BLD AUTO: 133 10(3)UL (ref 150–450)
POTASSIUM SERPL-SCNC: 3.9 MMOL/L (ref 3.5–5.1)
PROTHROMBIN TIME: 14.7 SECONDS (ref 11.8–14.5)
RBC # BLD AUTO: 2.94 X10(6)UL
SODIUM SERPL-SCNC: 139 MMOL/L (ref 136–145)
WBC # BLD AUTO: 6.9 X10(3) UL (ref 4–11)

## 2021-08-18 PROCEDURE — 99233 SBSQ HOSP IP/OBS HIGH 50: CPT | Performed by: HOSPITALIST

## 2021-08-18 PROCEDURE — 90792 PSYCH DIAG EVAL W/MED SRVCS: CPT | Performed by: OTHER

## 2021-08-18 RX ORDER — ESCITALOPRAM OXALATE 5 MG/1
5 TABLET ORAL NIGHTLY
Status: DISCONTINUED | OUTPATIENT
Start: 2021-08-18 | End: 2021-08-20

## 2021-08-18 RX ORDER — DIAPER,BRIEF,INFANT-TODD,DISP
EACH MISCELLANEOUS 2 TIMES DAILY PRN
Status: DISCONTINUED | OUTPATIENT
Start: 2021-08-18 | End: 2021-08-20

## 2021-08-18 RX ORDER — METHYLPHENIDATE HYDROCHLORIDE 5 MG/1
5 TABLET ORAL DAILY
Status: DISCONTINUED | OUTPATIENT
Start: 2021-08-19 | End: 2021-08-20

## 2021-08-18 RX ORDER — MELATONIN
1000 DAILY
Status: DISCONTINUED | OUTPATIENT
Start: 2021-08-19 | End: 2021-08-20

## 2021-08-18 RX ORDER — DEXTROSE MONOHYDRATE 25 G/50ML
50 INJECTION, SOLUTION INTRAVENOUS
Status: DISCONTINUED | OUTPATIENT
Start: 2021-08-18 | End: 2021-08-20

## 2021-08-18 RX ORDER — SODIUM CHLORIDE 9 MG/ML
INJECTION, SOLUTION INTRAVENOUS CONTINUOUS
Status: DISCONTINUED | OUTPATIENT
Start: 2021-08-18 | End: 2021-08-20

## 2021-08-18 NOTE — BH PROGRESS NOTE
Behavioral Health Note:  REASON FOR ADMISSION:   Nausea, vomiting, diarrhea    REASON FOR CONSULT:  Psychiatry consultation requested for evaluation and advice d/t depression and mild metabolic delirium    OBJECTIVE:  Sadaf Alatorre is a 68year old male who presen level of assist they cannot accommodate but Pramod Claire has been reluctant. RN and chart review both reflect no known hx of SI/HI/SIB.     PAST PSYCHIATRIC HISTORY:  Past diagnosis: ANITRA, no hx per chart review  Past inpatient: ANITRA, no hx per chart review  Past will reassess when he is able to participate    Paola Carlos has a dx r/o unspecified delirium, r/o unspecified depressive disorder    PLAN  1.  Psych consult ordered for Dr. Juani Coelho, [facility]

## 2021-08-18 NOTE — PROGRESS NOTES
Assessment and Plan:     1. N/V/D  - possible ischemic bowel  2. HFpEF  - Echo 5/22/2021: EF 55-60%; CVP-3; RVSP-21  3. AIVR  4.  pAF  - listed in problem list from [address] [place] [address] in 2020  - Eliquis restarted        PLAN:  - presently SR with AIVR  - will fo to a localized ileus. 2. 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   While a dedicated CT angiogram was not requested/performed, the mes occipital lobe infarction. 3. No hemodynamically significant stenosis or dissection involving the cervical carotid or vertebral arteries. There is bilateral non flow-limiting carotid bifurcation atherosclerosis.  4. Dilatation of the main pulmonary artery

## 2021-08-18 NOTE — CM/SW NOTE
Patient was discussed in rounds, likely not able to make his own decisions at this time. ELINA called and LVM for Taney Fridge [phone] at Dr. Lázaro Arana office. ELINA called and spoke with Admissions Coordinator [address] [place] [address] at [facility].  Raeann berry

## 2021-08-18 NOTE — PROGRESS NOTES
RRT    *See RRT Documentation Record*    Reason the RRT was called: Change in LOC, inability to speak  Assessment of patient leading up to RRT: Stable, intermittent confusion  Interventions/Testing: Stat CT scan  Patient Outcome/Dispostion: CT scan negativ

## 2021-08-18 NOTE — PLAN OF CARE
Problem: Patient Centered Care  Goal: Patient preferences are identified and integrated in the patient's plan of care  Description: Interventions:  - What would you like us to know as we care for you?  I live at [facility]  - Provide timely, complete, balance  Outcome: Progressing  Goal: Maintains or returns to baseline bowel function  Description: INTERVENTIONS:  - Assess bowel function  - Maintain adequate hydration with IV or PO as ordered and tolerated  - Evaluate effectiveness of GI medications  - resources  Description: INTERVENTIONS:  - Identify barriers to discharge w/pt and caregiver  - Include patient/family/discharge partner in discharge planning  - Arrange for needed discharge resources and transportation as appropriate  - Identify discharge

## 2021-08-18 NOTE — PROGRESS NOTES
[place] [facility] - [facility]    Progress Note    Rafael Harris Patient Status:  Inpatient    1945 MRN [id number]   Location [facility] 2W/SW Attending Belem Simons, [address] [facility] Day # 3 PCP WILNER Harrell       Subjective:   Rafael Harris i 53* 56*   GFRNAA 36* 46* 48*   CA 8.5 8.5 8.4*   ALB 3.0* 3.1* 3.0*    142 138   K 4.0 3.8 4.1    112 110   CO2 23.0 24.0 22.0   ALKPHO 116 111 103   AST 29 29 26   ALT 40 38 30   BILT 0.6 0.6 0.7   TP 6.4 6.6 6.8   KINA 38  --   -- immediately at 1352 hours to the inpatient floor and discussed with Dr. Stefanie Dudley.     Dictated by (CST): Sam Toribio MD on 8/17/2021 at 1:51 PM     Finalized by (CST): Sam Toribio MD on 8/17/2021 at 1:55 PM          CT STROKE CTA BRAIN/CTA NECK (W IV)

## 2021-08-18 NOTE — PLAN OF CARE
Patient comfortable, able to communicate with less difficulty/more lucidity after resting most of shift. Appetite increased in evening, tolerating full liquids, interested mainly in sweet foods and advancing to \"chicken tenders\".  Patient asking about his to more nausea    Interventions:   - See additional Care Plan goals for specific interventions  Outcome: Progressing     Problem: GASTROINTESTINAL - ADULT  Goal: Minimal or absence of nausea and vomiting  Description: INTERVENTIONS:  - Maintain adequate hy Identify cognitive and physical deficits and behaviors that affect risk of falls.   - [place] fall precautions as indicated by assessment.  - Educate pt/family on patient safety including physical limitations  - Instruct pt to call for assistance with act

## 2021-08-18 NOTE — PLAN OF CARE
Patient with varying levels of intermittent confusion, exhibited signs of potential stroke symptoms, code stroke called - see note. Patient condition improved in afternoon, able to verbalize thoughts better and in full sentences for remainder of shift.  Vit Goal  Description: Patient's Long Term Goal: to return home    Interventions  - See additional Care Plan goals for specific interventions  Outcome: Progressing  Goal: Patient/Family Short Term Goal  Description: Patient's Short Term Goal: to more nausea pain  - Anticipate increased pain with activity and pre-medicate as appropriate  Outcome: Progressing     Problem: SAFETY ADULT - FALL  Goal: Free from fall injury  Description: INTERVENTIONS:  - Assess pt frequently for physical needs  - Identify cognitiv

## 2021-08-18 NOTE — SLP NOTE
ADULT SWALLOWING & SPEECH/LANGUAGE EVALUATION    ASSESSMENT    PPE REQUIRED. THIS SLP WORE GOWN, GLOVES, AND DROPLET MASK. HANDS SANITIZED/WASHED UPON ENTRANCE/EXIT. SLP BSSE orders received and acknowledged. Evaluations warranted d/t stroke w/uSheila Weaver correctly respond to y/n questions, follow commands, complete confrontational naming tasks, automatic tasks, and evidenced of word finding (e.g., \"Whats that called? \" \"Oh, Um. \" \"I don't know. \") across assessment and conversation.  At this time, WARREN miller consistencies       SWALLOWING HISTORY  Current Diet Consistency:  (Full Liquid Diet )  Dysphagia History: None at [address] [facility]   Imaging Results:   CXR   CONCLUSION:   1. Stable cardiomegaly. 2. No acute pulmonary process.    A preliminary report was issued by th esophageal involvement      SWALLOW GOALS  Goal #1 The patient will tolerate full liquid diet of pureed solid consistency and thin liquids without overt signs or symptoms of aspiration with 100 % accuracy over 1-2 session(s).   In Progress   Goal #2 The pat Ext #[id number]

## 2021-08-18 NOTE — PROGRESS NOTES
[place] [facility] - [facility]    Progress Note    Refugia Province Patient Status:  Inpatient    1945 MRN [id number]   Location [facility] 4W/SW/SE Attending Ganga Garcia Day # 3 PCP WILNER TAFOYA     HPI/Subjective:     Con 08/05/2017    MG 1.9 08/18/2021    PHOS 2.8 08/18/2021    TROP <0.045 08/14/2021    RPR Nonreactive 08/05/2017    B12 327 05/21/2021       CT ABDOMEN+PELVIS(CONTRAST ONLY)(CPT=74177)    Result Date: 8/17/2021  CONCLUSION:  1.  No acute intra-abdominal proce 8/17/2021 at 1:51 PM     Finalized by (CST): Dale Riddle MD on 8/17/2021 at 1:55 PM          CT STROKE CTA BRAIN/CTA NECK (W IV)(CPT=70496/52677)    Result Date: 8/17/2021  CONCLUSION:  1.  No proximal intracranial flow limiting stenosis/large vessel oc po intake   - ct with renal aneurysms b/l - will need f/u imaging      Uncontrolled diabetes with associated nephropathy  We will hold oral hypoglycemics for now, use sliding scale coverage as needed, last A1c 7.3     Atrial fibrillation - with VT on tele.

## 2021-08-19 LAB
ANION GAP SERPL CALC-SCNC: 7 MMOL/L (ref 0–18)
BASOPHILS # BLD AUTO: 0.02 X10(3) UL (ref 0–0.2)
BASOPHILS NFR BLD AUTO: 0.4 %
BUN BLD-MCNC: 19 MG/DL (ref 7–18)
BUN/CREAT SERPL: 14.5 (ref 10–20)
CALCIUM BLD-MCNC: 8.6 MG/DL (ref 8.5–10.1)
CHLORIDE SERPL-SCNC: 112 MMOL/L (ref 98–112)
CO2 SERPL-SCNC: 22 MMOL/L (ref 21–32)
CREAT BLD-MCNC: 1.31 MG/DL
DEPRECATED RDW RBC AUTO: 42.4 FL (ref 35.1–46.3)
EOSINOPHIL # BLD AUTO: 0.19 X10(3) UL (ref 0–0.7)
EOSINOPHIL NFR BLD AUTO: 3.4 %
ERYTHROCYTE [DISTWIDTH] IN BLOOD BY AUTOMATED COUNT: 11.6 % (ref 11–15)
GLUCOSE BLD-MCNC: 156 MG/DL (ref 70–99)
GLUCOSE BLDC GLUCOMTR-MCNC: 160 MG/DL (ref 70–99)
GLUCOSE BLDC GLUCOMTR-MCNC: 223 MG/DL (ref 70–99)
GLUCOSE BLDC GLUCOMTR-MCNC: 284 MG/DL (ref 70–99)
GLUCOSE BLDC GLUCOMTR-MCNC: 294 MG/DL (ref 70–99)
HAV IGM SER QL: 2 MG/DL (ref 1.6–2.6)
HCT VFR BLD AUTO: 28.2 %
HGB BLD-MCNC: 9.3 G/DL
IMM GRANULOCYTES # BLD AUTO: 0.01 X10(3) UL (ref 0–1)
IMM GRANULOCYTES NFR BLD: 0.2 %
LYMPHOCYTES # BLD AUTO: 1.25 X10(3) UL (ref 1–4)
LYMPHOCYTES NFR BLD AUTO: 22.4 %
MCH RBC QN AUTO: 32.6 PG (ref 26–34)
MCHC RBC AUTO-ENTMCNC: 33 G/DL (ref 31–37)
MCV RBC AUTO: 98.9 FL
MONOCYTES # BLD AUTO: 0.49 X10(3) UL (ref 0.1–1)
MONOCYTES NFR BLD AUTO: 8.8 %
NEUTROPHILS # BLD AUTO: 3.61 X10 (3) UL (ref 1.5–7.7)
NEUTROPHILS # BLD AUTO: 3.61 X10(3) UL (ref 1.5–7.7)
NEUTROPHILS NFR BLD AUTO: 64.8 %
OSMOLALITY SERPL CALC.SUM OF ELEC: 297 MOSM/KG (ref 275–295)
PHOSPHATE SERPL-MCNC: 2.5 MG/DL (ref 2.5–4.9)
PLATELET # BLD AUTO: 123 10(3)UL (ref 150–450)
POTASSIUM SERPL-SCNC: 3.8 MMOL/L (ref 3.5–5.1)
RBC # BLD AUTO: 2.85 X10(6)UL
SODIUM SERPL-SCNC: 141 MMOL/L (ref 136–145)
WBC # BLD AUTO: 5.6 X10(3) UL (ref 4–11)

## 2021-08-19 PROCEDURE — 99233 SBSQ HOSP IP/OBS HIGH 50: CPT | Performed by: HOSPITALIST

## 2021-08-19 RX ORDER — CYANOCOBALAMIN 1000 UG/ML
1000 INJECTION INTRAMUSCULAR; SUBCUTANEOUS
Status: DISCONTINUED | OUTPATIENT
Start: 2021-08-19 | End: 2021-08-20

## 2021-08-19 NOTE — CM/SW NOTE
SW met with patient at bedside. Patient is alert, unsure of how much information is was able to retain from visit. Patient confirmed that he is from [facility]. Patient did not have any additional information on family contacts.  Reports th

## 2021-08-19 NOTE — PHYSICAL THERAPY NOTE
PHYSICAL THERAPY TREATMENT NOTE - INPATIENT     Room Number: 465/465-A       Presenting Problem: pneumatosis of intestines    Problem List  Principal Problem:    Pneumatosis of intestines  Active Problems:    Nausea and vomiting, intractability of vomiting BEARING RESTRICTION  Weight Bearing Restriction: None                PAIN ASSESSMENT   Rating: Unable to rate  Location: sensitivity to light touch bilateral LE's and LUE  Management Techniques: Activity promotion; Body mechanics;Repositioning    BALANCE device: walker - rolling at assistance level: moderate assistance   Goal #3   Current Status GOAL MET    UPDATE GOAL:Patient is able to ambulate min A 150ft with RW   Goal #4 Patient to demonstrate independence with home activity/exercise instructions prov

## 2021-08-19 NOTE — PLAN OF CARE
Alert, confused at times but more oriented today. Worked with PT and was able to walk in the hallways and sat in chair this afternoon. Diet advanced to low fiber/soft, tolerating well. Had a large bowel movement this shift. Voiding WNL. Denies pain.  IVF in

## 2021-08-19 NOTE — PROGRESS NOTES
Assessment and Plan:     1. N/V/D  - possible ischemic bowel  2. HFpEF  - Echo 5/22/2021: EF 55-60%; CVP-3; RVSP-21  3. AIVR  4.  pAF  - listed in problem list from [address] [place] [address] in 2020  - Eliquis restarted        PLAN:  - presently SR with AIVR  - will fo related to a localized ileus. 2. 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   While a dedicated CT angiogram was not requested/performed, right occipital lobe infarction. 3. No hemodynamically significant stenosis or dissection involving the cervical carotid or vertebral arteries. There is bilateral non flow-limiting carotid bifurcation atherosclerosis.  4. Dilatation of the main pulmonary a

## 2021-08-19 NOTE — PLAN OF CARE
Patient is resting in bed, A&O x 2-3 with delayed responses and sometimes has difficulty finding the right words to say. Patient denies pain. Hypertension managed with hydralazine PRN. Tolerating full liquid diet with no complaints of nausea.  IVF and antib Progressing     Problem: GASTROINTESTINAL - ADULT  Goal: Minimal or absence of nausea and vomiting  Description: INTERVENTIONS:  - Maintain adequate hydration with IV or PO as ordered and tolerated  - Nasogastric tube to low intermittent suction as ordered precautions as indicated by assessment.  - Educate pt/family on patient safety including physical limitations  - Instruct pt to call for assistance with activity based on assessment  - Modify environment to reduce risk of injury  - Provide assistive device

## 2021-08-19 NOTE — CONSULTS
[facility]    Report of Consultation    Belkis Zelaya Patient Status:  Inpatient    1945 MRN [id number]   Location [facility] 4W/SW/SE Attending Ganga Garcia Day # 3 PCP Patrica Ganser     Date of Admiss that I will be tomorrow to talk to him to continue our discussion. Otherwise the patient agreeable on medication for depression and energy.     RN Cyndee Arvizu today reports that Solomon Soliman has been primarily a/o x2 throughout admission with reported onset of po review he has an estranged daughter whom he has not had any contact with in several years and lives in [place]. He has no social supports at this time. Per chart review and discussion with RN there is no known hx of alcohol, tobacco, cannabis use.  However, he h Daily  apixaban (ELIQUIS) tab 5 mg, 5 mg, Oral, BID  aspirin chewable tab 81 mg, 81 mg, Oral, Daily  atorvastatin (LIPITOR) tab 40 mg, 40 mg, Oral, Daily  finasteride (PROSCAR) tab 5 mg, 5 mg, Oral, Nightly  metoprolol tartrate (LOPRESSOR) tab 25 mg, 25 mg daily.  metoprolol Tartrate 25 MG Oral Tab, Take 25 mg by mouth 2 (two) times daily. Oxybutynin Chloride ER 5 MG Oral Tablet 24 Hr, Take 5 mg by mouth daily. Potassium Chloride ER 20 MEQ Oral Tab CR, Take 20 mEq by mouth daily.   tamsulosin (FLOMAX) cap, abdomen on the previous CT has resolved and probably related to a localized ileus. 2. 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   While artery territory, which is compatible with known chronic right occipital lobe infarction. 3. No hemodynamically significant stenosis or dissection involving the cervical carotid or vertebral arteries.   There is bilateral non flow-limiting carotid bifurcati history otherwise the patient has been demonstrating some behavior indicate depression presented by being withdrawn, irritable, lack of social engagement and very withdrawn. Patient agrees that he is depressed with no energy.   Patient agreeable on medicat Visit:  Requested Prescriptions      No prescriptions requested or ordered in this encounter           Marla Lizama MD  8/18/2021

## 2021-08-19 NOTE — SLP NOTE
SPEECH DAILY NOTE - INPATIENT    ASSESSMENT & PLAN   ASSESSMENT      Proper PPE worn. Hands sanitized upon entrance/exit Pt room. Pt alert, afebrile and on room air. Pt seen for swallow analysis per BSE recommendations (after consulting with RN).  P Progress   Goal #3 The patient will utilize compensatory strategies as outlined by  BSSE (clinical evaluation) including Slow rate, Small bites, Small sips, Upright 90 degrees, Upright 90 degrees 30 mins after meal, Feed patient, Supervision with meals wit

## 2021-08-19 NOTE — PROGRESS NOTES
[facility]    Progress Note    Refugia Province Patient Status:  Inpatient    1945 MRN [id number]   Location [facility] 2W/SW Attending Sonia Connor, [address] [facility] Day # 4 PCP WILNER Wheatley       Subjective:   Refia Province i 08/18/21  0940 08/19/21  0613   *   < > 160*   < > 196* 212* 156*   BUN 69*   < > 48*   < > 30* 21* 19*   CREATSERUM 1.78*   < > 1.47*   < > 1.41* 1.40* 1.31*   GFRAA 42*   < > 53*   < > 56* 56* 61   GFRNAA 36*   < > 46*   < > 48* 48* 53*   CA 8.5 flow-limiting carotid bifurcation atherosclerosis. 4. Dilatation of the main pulmonary artery trunk may relate to underlying pulmonary hypertension. 5. Lesser incidental findings as above.   This report was called immediately at 1420 hours to the inpatient

## 2021-08-19 NOTE — PROGRESS NOTES
[facility]    Progress Note    Omar Cruz Patient Status:  Inpatient    1945 MRN [id number]   Location [facility] 4W/SW/SE Attending Ganga Garcia Day # 4 PCP WILNER TAFOYA     HPI/Subjective:     Con 08/05/2017    MG 2.0 08/19/2021    PHOS 2.5 08/19/2021    TROP <0.045 08/14/2021    RPR Nonreactive 08/05/2017    B12 327 05/21/2021       No results found.         Assessment & Plan:          Possible ischemic bowel  CT scan suggestive of pneumatosis, lact

## 2021-08-20 VITALS
WEIGHT: 228 LBS | OXYGEN SATURATION: 98 % | TEMPERATURE: 99 F | BODY MASS INDEX: 30.88 KG/M2 | SYSTOLIC BLOOD PRESSURE: 168 MMHG | RESPIRATION RATE: 16 BRPM | HEART RATE: 63 BPM | HEIGHT: 72 IN | DIASTOLIC BLOOD PRESSURE: 71 MMHG

## 2021-08-20 LAB
ANION GAP SERPL CALC-SCNC: 8 MMOL/L (ref 0–18)
BASOPHILS # BLD AUTO: 0.03 X10(3) UL (ref 0–0.2)
BASOPHILS NFR BLD AUTO: 0.5 %
BUN BLD-MCNC: 19 MG/DL (ref 7–18)
BUN/CREAT SERPL: 14.6 (ref 10–20)
CALCIUM BLD-MCNC: 8.2 MG/DL (ref 8.5–10.1)
CHLORIDE SERPL-SCNC: 111 MMOL/L (ref 98–112)
CO2 SERPL-SCNC: 22 MMOL/L (ref 21–32)
CREAT BLD-MCNC: 1.3 MG/DL
DEPRECATED RDW RBC AUTO: 41 FL (ref 35.1–46.3)
EOSINOPHIL # BLD AUTO: 0.22 X10(3) UL (ref 0–0.7)
EOSINOPHIL NFR BLD AUTO: 3.7 %
ERYTHROCYTE [DISTWIDTH] IN BLOOD BY AUTOMATED COUNT: 11.5 % (ref 11–15)
GLUCOSE BLD-MCNC: 164 MG/DL (ref 70–99)
HAV IGM SER QL: 1.6 MG/DL (ref 1.6–2.6)
HCT VFR BLD AUTO: 26.9 %
HGB BLD-MCNC: 9 G/DL
IMM GRANULOCYTES # BLD AUTO: 0.01 X10(3) UL (ref 0–1)
IMM GRANULOCYTES NFR BLD: 0.2 %
LYMPHOCYTES # BLD AUTO: 1.3 X10(3) UL (ref 1–4)
LYMPHOCYTES NFR BLD AUTO: 21.7 %
MCH RBC QN AUTO: 32.4 PG (ref 26–34)
MCHC RBC AUTO-ENTMCNC: 33.5 G/DL (ref 31–37)
MCV RBC AUTO: 96.8 FL
MONOCYTES # BLD AUTO: 0.57 X10(3) UL (ref 0.1–1)
MONOCYTES NFR BLD AUTO: 9.5 %
NEUTROPHILS # BLD AUTO: 3.86 X10 (3) UL (ref 1.5–7.7)
NEUTROPHILS # BLD AUTO: 3.86 X10(3) UL (ref 1.5–7.7)
NEUTROPHILS NFR BLD AUTO: 64.4 %
OSMOLALITY SERPL CALC.SUM OF ELEC: 298 MOSM/KG (ref 275–295)
PHOSPHATE SERPL-MCNC: 2.2 MG/DL (ref 2.5–4.9)
PLATELET # BLD AUTO: 120 10(3)UL (ref 150–450)
POTASSIUM SERPL-SCNC: 3.3 MMOL/L (ref 3.5–5.1)
RBC # BLD AUTO: 2.78 X10(6)UL
SODIUM SERPL-SCNC: 141 MMOL/L (ref 136–145)
VIT B12 SERPL-MCNC: >2000 PG/ML (ref 193–986)
WBC # BLD AUTO: 6 X10(3) UL (ref 4–11)

## 2021-08-20 PROCEDURE — 99239 HOSP IP/OBS DSCHRG MGMT >30: CPT | Performed by: HOSPITALIST

## 2021-08-20 PROCEDURE — 99231 SBSQ HOSP IP/OBS SF/LOW 25: CPT | Performed by: OTHER

## 2021-08-20 RX ORDER — POTASSIUM CHLORIDE 1500 MG/1
1 TABLET, FILM COATED, EXTENDED RELEASE ORAL
Qty: 60 TABLET | Refills: 0 | Status: SHIPPED | OUTPATIENT
Start: 2021-08-20

## 2021-08-20 RX ORDER — DIAPER,BRIEF,INFANT-TODD,DISP
1 EACH MISCELLANEOUS 2 TIMES DAILY PRN
Qty: 1 EACH | Refills: 0 | Status: SHIPPED | OUTPATIENT
Start: 2021-08-20

## 2021-08-20 RX ORDER — FUROSEMIDE 20 MG/1
20 TABLET ORAL DAILY
Qty: 30 TABLET | Refills: 0 | Status: SHIPPED | OUTPATIENT
Start: 2021-08-20 | End: 2021-08-20

## 2021-08-20 RX ORDER — FUROSEMIDE 10 MG/ML
20 INJECTION INTRAMUSCULAR; INTRAVENOUS ONCE
Status: COMPLETED | OUTPATIENT
Start: 2021-08-20 | End: 2021-08-20

## 2021-08-20 RX ORDER — ESCITALOPRAM OXALATE 5 MG/1
5 TABLET ORAL NIGHTLY
Qty: 30 TABLET | Refills: 0 | Status: SHIPPED | OUTPATIENT
Start: 2021-08-20

## 2021-08-20 RX ORDER — MAGNESIUM OXIDE 400 MG (241.3 MG MAGNESIUM) TABLET
400 TABLET DAILY
Qty: 5 TABLET | Refills: 0 | Status: SHIPPED | OUTPATIENT
Start: 2021-08-20

## 2021-08-20 RX ORDER — FUROSEMIDE 20 MG/1
20 TABLET ORAL DAILY
Qty: 30 TABLET | Refills: 0 | Status: SHIPPED | OUTPATIENT
Start: 2021-08-20

## 2021-08-20 RX ORDER — MAGNESIUM OXIDE 400 MG (241.3 MG MAGNESIUM) TABLET
400 TABLET ONCE
Status: COMPLETED | OUTPATIENT
Start: 2021-08-20 | End: 2021-08-20

## 2021-08-20 RX ORDER — POTASSIUM CHLORIDE 14.9 MG/ML
20 INJECTION INTRAVENOUS ONCE
Status: DISCONTINUED | OUTPATIENT
Start: 2021-08-20 | End: 2021-08-20

## 2021-08-20 RX ORDER — AMOXICILLIN AND CLAVULANATE POTASSIUM 875; 125 MG/1; MG/1
1 TABLET, FILM COATED ORAL 2 TIMES DAILY
Qty: 11 TABLET | Refills: 0 | Status: SHIPPED | OUTPATIENT
Start: 2021-08-20 | End: 2021-08-26

## 2021-08-20 RX ORDER — METHYLPHENIDATE HYDROCHLORIDE 5 MG/1
5 TABLET ORAL DAILY
Qty: 30 TABLET | Refills: 0 | Status: SHIPPED | OUTPATIENT
Start: 2021-08-20

## 2021-08-20 NOTE — PLAN OF CARE
Tolerating low fiber/soft diet, denies nausea. Denies pain. Walking with 2 person assist and walker, unsteady gait. Primofit in place. Replaced potassium, phosphorus, and magnesium per protocol.  Bed in lowest position, call light in reach, fall precautions

## 2021-08-20 NOTE — PROGRESS NOTES
[facility]    Progress Note    Broderick Baker Patient Status:  Inpatient    1945 MRN [id number]   Location [facility] 2W/SW Attending Duy Lua, [address] [facility] Day # 5 PCP WILNER York Offer       Subjective:   Broderick Baker i 08/17/21  3715 08/17/21  1240 08/18/21  0940 08/19/21  0613 08/20/21  0607   *   < > 160*   < > 196*   < > 212* 156* 164*   BUN 69*   < > 48*   < > 30*   < > 21* 19* 19*   CREATSERUM 1.78*   < > 1.47*   < > 1.41*   < > 1.40* 1.31* 1.30   GFRAA 42*

## 2021-08-20 NOTE — DISCHARGE SUMMARY
Dc summary#[id number]  > 30 min spent on [address] Discharge Diagnoses: abd pain    Lace+ Score: 79  59-90 High Risk  29-58 Medium Risk  0-28   Low Risk. TCM Follow-Up Recommendation:  LACE > 58:  High Risk of readmission after discharge from the hosp

## 2021-08-20 NOTE — PAYOR COMM NOTE
--------------  DISCHARGE REVIEW    Payor: Hari Anand #:  [id number]  Authorization Number: [id number]    Admit date: 8/15/21  Admit time:   9:21 AM  Discharge Date: 8/20/2021  1:59 PM     Admitting Physician: Tessie Sosa

## 2021-08-20 NOTE — PROGRESS NOTES
[facility]  [address] [facility], [address] [place] [address]  [phone]        INPATIENT NEUROLOGY   FOLLOW UP PROGRESS NOTE  [address] [facility] Patient Status:  Inpatient     1945 MRN [id number] reduced intake of sodium and increased intake of potassium, increase consumption of fruits, vegetables, and low-fat dairy products and  decreased consumption of saturated fat.      Neurology signed off           INTERVAL EVENTS  • 08/20/21:        Teena Mosquera Fundoscopic exam: Fundus not visualized  - Motor:   normal bulk. No interosseous wasting. No flattening of hypothenar eminences. Moving both his arms symmetrically and equally. Patient was eating. Is no obvious hemiparesis. No loss of dexterity.      Renetta Wells 08/20/2021    CO2 22.0 08/20/2021     (H) 08/20/2021    CA 8.2 (L) 08/20/2021    ALB 3.0 (L) 08/17/2021    ALKPHO 103 08/17/2021    TP 6.8 08/17/2021    AST 26 08/17/2021    ALT 30 08/17/2021    .6 (H) 08/17/2021    INR 1.17 08/18/2021    PTP DIFFERENTIAL    Collection Time: 08/18/21  9:40 AM   Result Value Ref Range    WBC 6.9 4.0 - 11.0 x10(3) uL    RBC 2.94 (L) 3.80 - 5.80 x10(6)uL    HGB 9.6 (L) 13.0 - 17.5 g/dL    HCT 29.5 (L) 39.0 - 53.0 %    .3 (H) 80.0 - 100.0 fL    MCH 32.7 26. 0 6:13 AM   Result Value Ref Range    WBC 5.6 4.0 - 11.0 x10(3) uL    RBC 2.85 (L) 3.80 - 5.80 x10(6)uL    HGB 9.3 (L) 13.0 - 17.5 g/dL    HCT 28.2 (L) 39.0 - 53.0 %    MCV 98.9 80.0 - 100.0 fL    MCH 32.6 26.0 - 34.0 pg    MCHC 33.0 31.0 - 37.0 g/dL    RDW- Magnesium 1.6 1.6 - 2.6 mg/dL   Phosphorus    Collection Time: 08/20/21  6:07 AM   Result Value Ref Range    Phosphorus 2.2 (L) 2.5 - 4.9 mg/dL   Vitamin B12    Collection Time: 08/20/21  6:07 AM   Result Value Ref Range    Vitamin B12 >2,000 (H) 193 - 986 you.  Pastor Joey D.O.   Vascular & General Neurology    8/20/2021  10:37 AM

## 2021-08-20 NOTE — PROGRESS NOTES
Assessment and Plan:     1. N/V/D  - possible ischemic bowel  2. HFpEF  - Echo 5/22/2021: EF 55-60%; CVP-3; RVSP-21  3. AIVR  4.  pAF  - listed in problem list from [address] [place] [address] in 2020  - Eliquis restarted        PLAN:  - presently SR with AIVR  - will fo

## 2021-08-20 NOTE — PLAN OF CARE
Patient is resting in bed, A&O x 2-3 with delayed responses and sometimes has difficulty finding the right words to say. Patient denies pain. Hydralazine given PRN for hypertension. Tolerating Lf/soft diet with no complaints of nausea.  IVF and antibiotics additional Care Plan goals for specific interventions  Outcome: Progressing     Problem: GASTROINTESTINAL - ADULT  Goal: Minimal or absence of nausea and vomiting  Description: INTERVENTIONS:  - Maintain adequate hydration with IV or PO as ordered and tole behaviors that affect risk of falls.   - [place] fall precautions as indicated by assessment.  - Educate pt/family on patient safety including physical limitations  - Instruct pt to call for assistance with activity based on assessment  - Modify environme

## 2021-08-20 NOTE — CM/SW NOTE
DC order present    Insurance authorization to [facility] obtained. ELINA inquired with [facility] about transport time. Awaiting review and response.     12am  ELINA notified by RN that patient is disagreeable to going to [facility],

## 2021-08-23 NOTE — DISCHARGE SUMMARY
[facility]    PATIENT'S NAME: Mily LYONS   ATTENDING PHYSICIAN: Tammy Garcia MD   PATIENT ACCOUNT#:   [id number]    LOCATION:  [address] [place] [address] RECORD #:   [id number]       YOB: 1945  ADMISSION DATE:       08/1 would take care of his pet cat until he is able to return. PHYSICAL EXAMINATION:    VITAL SIGNS:  Temperature is 98.6, pulse is 63, respiratory is 16, blood pressure is 151/71, 98%. LUNGS:  Clear. HEART:  Normal S1, S2.   No S3.  ABDOMEN:  Soft, curr 20 mEq every other day. 13.   Vitamin B6 at 100 mg daily. 14.   Sitagliptin 50 mg daily. 15.   Flomax 0.4 mg daily. 16.   Vitamin D3 at 1000 units daily. 17.   Citalopram 5 mg nightly, watch for severe muscle stiffness and fever.   18.   Hydrocortisone

## 2021-10-22 ENCOUNTER — APPOINTMENT (OUTPATIENT)
Dept: CT IMAGING | Facility: HOSPITAL | Age: 76
End: 2021-10-22
Attending: EMERGENCY MEDICINE
Payer: MEDICARE

## 2021-10-22 PROCEDURE — 70450 CT HEAD/BRAIN W/O DYE: CPT | Performed by: EMERGENCY MEDICINE

## 2021-10-22 NOTE — ED INITIAL ASSESSMENT (HPI)
Became confused at [facility] pta and walked into another residents room and exposed himself and urinated on the floor. No c/o.

## 2021-10-22 NOTE — ED PROVIDER NOTES
Patient Seen in: [facility] AND [facility] Emergency Department      History   Patient presents with:  Altered Mental Status    Stated Complaint: ams    Subjective:   HPI    78-year-old male with history of CKD, hypertension, diabetes, hyperlipidemia, paroxysmal and are negative. Positive for stated complaint: ams  Other systems are as noted in HPI. Constitutional and vital signs reviewed. All other systems reviewed and negative except as noted above.     Physical Exam     ED Triage Vitals [10/22/21 1501 Bacteria Urine None Seen None Seen /HPF    Squamous Epi. Cells Few (A) None Seen /HPF       Imaging Results Available and Reviewed by me while in ED:  CT BRAIN OR HEAD (52690)    Result Date: 10/22/2021  CONCLUSION:   No acute intracranial abnormality. [id number] [phone]    Call in 2 days  As needed    We recommend that you schedule follow up care with a primary care provider within the next three months to obtain basic health screening including reassessment of your blood pressure.       Medication

## 2021-10-22 NOTE — ED QUICK NOTES
[place] [facility] contacted for transport back to [facility] in [facility] 2-3 hours.

## 2022-04-04 NOTE — ED PROVIDER NOTES
Patient Seen in: [facility] AND [facility] Emergency Department      History   Patient presents with:  Swelling Edema    Stated Complaint: BLE edema     HPI/Subjective:   HPI    Patient presents to the emergency department with severe bilateral lower extremity e Tammy Vicky Woodard  : 1968  Payor: Spohia Lopez [facility] MEDICARE / Plan: [facility] MEDICARE HMO/PPO / Product Type: Managed Care Medicare /  [address] [place] [address]  at [place] [address] Therapy  [address], [address]  [phone]   [phone]                                                            Richelle Bradley MD      OUTPATIENT PHYSICAL THERAPY: Daily Treatment Note 2022 Visit Count:  7    Tx Diagnosis:  Pain in right knee (M25.561)  Effusion, right knee (M25.461)  Stiffness of right knee, not elsewhere classified (M25.661)        Pre-treatment Symptoms/Complaints: See Initial Eval Dated 3.14.22 for more details. Patient reports knee is doing pretty good just tight more than anything today. Pain: Initial:3/10  Medications Last Reviewed:  2022     Post Session: 2/10   Updated Objective Findings: See Initial Eval for more details. TREATMENT:   THERAPEUTIC EXERCISE: (15 min):  Exercises per grid below to improve mobility, strength and balance. Required minimal visual, verbal and manual cues to promote proper body alignment and promote proper body posture. Progressed resistance and complexity of movement as indicated. Date:  3 /25 Date:  3/28 Date  3/30 Date   Date     Activity/Exercise Parameters Parameters Parameters Parameters Parameters   Education        Heel slides        Quad Set X 30       NuStep  X 10 min X 10 min X 10 min X 10 min   Walking Without cane x 75 ft X 150 ft without cane      SAQ X 30 X 30      LAQ        Step ups  X 10 X 15  X 15 X 15 X 20   Standing weightshift X 15       Hamstring machine   # 20 2 x 10 # 20 2 x 10 # 25 3 x 10 # 25 3 x 10   Sit to stand    X 10 X 10 X 15           MANUAL THERAPY: (30 min): Joint mobilization, Soft tissue mobilization was utilized and necessary because of the patient's restricted joint motion and restricted motion of soft tissue mobility.         Date  2022    Technique Used Grade Level # Time(s) Effect while being performed   STM Supine L knee to improve ROM, pain in sitting and supine                                                              HEP Log Date 1.    4/4/2022   2.  4/4/2022   3. 4/4/2022   4.    5.           [facility] Portal  Treatment/Session Summary:    Response to Treatment: Pt demonstrated understanding of POC and initial HEP. No increase in pain or adverse reactions. Patient tolerated treatment well today. Patient needs to continue to work on ROM and strength. Patient is scheduled to see the doctor today concerning her other knee. Communication/Consultation:  POC, HEP, PT goals, Faxed initial evaluation to MD.   Equipment provided today: HEP Handout   Recommendations/Intent for next treatment session:   Next visit will focus on Manual Therapy Quad strengthening soft tissue mobilization gait training, ROM. Treatment Plan of Care Effective Dates: 4/4/2022 TO 6/12/2022 (90 days).   Frequency/Duration: 2-3 times a week for 90 Days             Total Treatment Billable Duration:  45   PT Patient Time In/Time Out  Time In: 0845  Time Out: Avery [address], PTA    Future Appointments   Date Time Provider Britney Burgos   4/6/2022  8:00 AM [facility]   4/13/2022 12:15 PM SFE ASSESSMENT RM 02 SFEORPA SFE   4/14/2022  8:30 AM MD UYEN Hansen POAI POA   4/15/2022  8:00 AM [facility]   5/26/2022 10:30 AM MD UYEN Hansen POAI POA   6/20/2022  8:00 AM BATOOL Floyd PSCD PP   8/17/2022 11:15 AM DO UYEN Shoemaker UCD Rate and Rhythm: Normal rate and regular rhythm. Heart sounds: No murmur heard. Pulmonary:      Effort: Pulmonary effort is normal. No respiratory distress. Breath sounds: Rales present. Abdominal:      General: There is no distension. Abnormal            Final result                 Please view results for these tests on the individual orders.    IRON AND TIBC   VITAMIN B12   FERRITIN   TSH W REFLEX TO FREE T4   HEMOGLOBIN A1C   RAINBOW DRAW LAVENDER   RAINBOW DRAW LIGHT GREEN   RAIN R79.89 5/21/2021 Yes    Hyperglycemia R73.9 5/21/2021 Yes    Thrombocytopenia ([facility] [address] D69.6 5/21/2021 Yes

## 2022-04-07 ENCOUNTER — APPOINTMENT (OUTPATIENT)
Dept: CT IMAGING | Facility: HOSPITAL | Age: 77
End: 2022-04-07
Attending: EMERGENCY MEDICINE
Payer: MEDICARE

## 2022-04-07 ENCOUNTER — HOSPITAL ENCOUNTER (EMERGENCY)
Facility: HOSPITAL | Age: 77
Discharge: HOME | End: 2022-04-07
Attending: EMERGENCY MEDICINE
Payer: MEDICARE

## 2022-04-07 VITALS
WEIGHT: 230 LBS | BODY MASS INDEX: 32.2 KG/M2 | OXYGEN SATURATION: 97 % | RESPIRATION RATE: 20 BRPM | TEMPERATURE: 98 F | HEIGHT: 71 IN | HEART RATE: 60 BPM | SYSTOLIC BLOOD PRESSURE: 145 MMHG | DIASTOLIC BLOOD PRESSURE: 70 MMHG

## 2022-04-07 DIAGNOSIS — S20.229A CONTUSION OF BACK, UNSPECIFIED LATERALITY, INITIAL ENCOUNTER: Primary | ICD-10-CM

## 2022-04-07 PROCEDURE — 99284 EMERGENCY DEPT VISIT MOD MDM: CPT

## 2022-04-07 PROCEDURE — 72131 CT LUMBAR SPINE W/O DYE: CPT | Performed by: EMERGENCY MEDICINE

## 2022-04-07 NOTE — ED INITIAL ASSESSMENT (HPI)
Patient from [facility] for fall this morning. Patient lost his footing and fell hitting his back against the toilet. Patient has back pain 6/10. Patient Eliquis.  Patient states pain is mostly with movement

## 2022-06-10 ENCOUNTER — HOSPITAL ENCOUNTER (EMERGENCY)
Facility: HOSPITAL | Age: 77
Discharge: HOME | End: 2022-06-10
Attending: EMERGENCY MEDICINE
Payer: MEDICARE

## 2022-06-10 ENCOUNTER — APPOINTMENT (OUTPATIENT)
Dept: GENERAL RADIOLOGY | Facility: HOSPITAL | Age: 77
End: 2022-06-10
Attending: EMERGENCY MEDICINE
Payer: MEDICARE

## 2022-06-10 ENCOUNTER — APPOINTMENT (OUTPATIENT)
Dept: CT IMAGING | Facility: HOSPITAL | Age: 77
End: 2022-06-10
Attending: EMERGENCY MEDICINE
Payer: MEDICARE

## 2022-06-10 VITALS
TEMPERATURE: 99 F | DIASTOLIC BLOOD PRESSURE: 82 MMHG | OXYGEN SATURATION: 97 % | HEART RATE: 56 BPM | HEIGHT: 72 IN | RESPIRATION RATE: 13 BRPM | SYSTOLIC BLOOD PRESSURE: 173 MMHG | BODY MASS INDEX: 32.51 KG/M2 | WEIGHT: 240 LBS

## 2022-06-10 DIAGNOSIS — R07.9 ACUTE CHEST PAIN: Primary | ICD-10-CM

## 2022-06-10 LAB
ALBUMIN SERPL-MCNC: 3 G/DL (ref 3.4–5)
ALBUMIN/GLOB SERPL: 0.8 {RATIO} (ref 1–2)
ALP LIVER SERPL-CCNC: 125 U/L
ALT SERPL-CCNC: 23 U/L
ANION GAP SERPL CALC-SCNC: 5 MMOL/L (ref 0–18)
AST SERPL-CCNC: 24 U/L (ref 15–37)
BASOPHILS # BLD AUTO: 0.03 X10(3) UL (ref 0–0.2)
BASOPHILS NFR BLD AUTO: 0.4 %
BILIRUB SERPL-MCNC: 0.5 MG/DL (ref 0.1–2)
BUN BLD-MCNC: 43 MG/DL (ref 7–18)
BUN/CREAT SERPL: 27 (ref 10–20)
CALCIUM BLD-MCNC: 8.9 MG/DL (ref 8.5–10.1)
CHLORIDE SERPL-SCNC: 107 MMOL/L (ref 98–112)
CO2 SERPL-SCNC: 28 MMOL/L (ref 21–32)
CREAT BLD-MCNC: 1.59 MG/DL
D DIMER PPP FEU-MCNC: 2.29 UG/ML FEU (ref ?–0.77)
DEPRECATED RDW RBC AUTO: 45.7 FL (ref 35.1–46.3)
EOSINOPHIL # BLD AUTO: 0.16 X10(3) UL (ref 0–0.7)
EOSINOPHIL NFR BLD AUTO: 2.3 %
ERYTHROCYTE [DISTWIDTH] IN BLOOD BY AUTOMATED COUNT: 12.7 % (ref 11–15)
GLOBULIN PLAS-MCNC: 3.7 G/DL (ref 2.8–4.4)
GLUCOSE BLD-MCNC: 144 MG/DL (ref 70–99)
HCT VFR BLD AUTO: 30.6 %
HGB BLD-MCNC: 9.9 G/DL
IMM GRANULOCYTES # BLD AUTO: 0.01 X10(3) UL (ref 0–1)
IMM GRANULOCYTES NFR BLD: 0.1 %
LYMPHOCYTES # BLD AUTO: 1.91 X10(3) UL (ref 1–4)
LYMPHOCYTES NFR BLD AUTO: 28 %
MCH RBC QN AUTO: 31.8 PG (ref 26–34)
MCHC RBC AUTO-ENTMCNC: 32.4 G/DL (ref 31–37)
MCV RBC AUTO: 98.4 FL
MONOCYTES # BLD AUTO: 0.69 X10(3) UL (ref 0.1–1)
MONOCYTES NFR BLD AUTO: 10.1 %
NEUTROPHILS # BLD AUTO: 4.02 X10 (3) UL (ref 1.5–7.7)
NEUTROPHILS # BLD AUTO: 4.02 X10(3) UL (ref 1.5–7.7)
NEUTROPHILS NFR BLD AUTO: 59.1 %
OSMOLALITY SERPL CALC.SUM OF ELEC: 303 MOSM/KG (ref 275–295)
PLATELET # BLD AUTO: 126 10(3)UL (ref 150–450)
POTASSIUM SERPL-SCNC: 4.6 MMOL/L (ref 3.5–5.1)
PROT SERPL-MCNC: 6.7 G/DL (ref 6.4–8.2)
RBC # BLD AUTO: 3.11 X10(6)UL
SODIUM SERPL-SCNC: 140 MMOL/L (ref 136–145)
TROPONIN I HIGH SENSITIVITY: 25 NG/L
TROPONIN I HIGH SENSITIVITY: 25 NG/L
WBC # BLD AUTO: 6.8 X10(3) UL (ref 4–11)

## 2022-06-10 PROCEDURE — 93010 ELECTROCARDIOGRAM REPORT: CPT | Performed by: EMERGENCY MEDICINE

## 2022-06-10 PROCEDURE — 71260 CT THORAX DX C+: CPT | Performed by: EMERGENCY MEDICINE

## 2022-06-10 PROCEDURE — 93005 ELECTROCARDIOGRAM TRACING: CPT

## 2022-06-10 PROCEDURE — 85379 FIBRIN DEGRADATION QUANT: CPT | Performed by: EMERGENCY MEDICINE

## 2022-06-10 PROCEDURE — 36415 COLL VENOUS BLD VENIPUNCTURE: CPT

## 2022-06-10 PROCEDURE — 99285 EMERGENCY DEPT VISIT HI MDM: CPT

## 2022-06-10 PROCEDURE — 85025 COMPLETE CBC W/AUTO DIFF WBC: CPT | Performed by: EMERGENCY MEDICINE

## 2022-06-10 PROCEDURE — 80053 COMPREHEN METABOLIC PANEL: CPT | Performed by: EMERGENCY MEDICINE

## 2022-06-10 PROCEDURE — 84484 ASSAY OF TROPONIN QUANT: CPT | Performed by: EMERGENCY MEDICINE

## 2022-06-10 PROCEDURE — 71045 X-RAY EXAM CHEST 1 VIEW: CPT | Performed by: EMERGENCY MEDICINE

## 2022-06-10 NOTE — ED QUICK NOTES
Pt A&OX4, DISCAHRGE DISCUSSED WITH PT, PT VERBALLY UNDERSTANDS THEM. SUPERIOR AT BEDSIDE, PATIENT UP AND AMBULATED WITH WALKER, IV REMOVED. PT INSTRUCTED TO FOLLOW-UP WITH PCP. Pt discharged in no acute distress. No answer at [facility] for report.

## 2022-06-10 NOTE — ED INITIAL ASSESSMENT (HPI)
EMS states that the patient C/O chest pain earlier in the am. No C/O chest pain now.  No C/O shortness of breath

## 2022-06-10 NOTE — ED QUICK NOTES
Rounding Completed    Plan of Care reviewed. Pt sleeping. Elimination needs assessed. Bed is locked and in lowest position. Call light within reach.

## 2022-10-11 ENCOUNTER — HOSPITAL ENCOUNTER (EMERGENCY)
Facility: HOSPITAL | Age: 77
Discharge: NURSING, REHAB OR LONG-TERM CARE | End: 2022-10-11
Attending: EMERGENCY MEDICINE
Payer: MEDICARE

## 2022-10-11 ENCOUNTER — APPOINTMENT (OUTPATIENT)
Dept: GENERAL RADIOLOGY | Facility: HOSPITAL | Age: 77
End: 2022-10-11
Attending: EMERGENCY MEDICINE
Payer: MEDICARE

## 2022-10-11 ENCOUNTER — APPOINTMENT (OUTPATIENT)
Dept: CT IMAGING | Facility: HOSPITAL | Age: 77
End: 2022-10-11
Attending: EMERGENCY MEDICINE
Payer: MEDICARE

## 2022-10-11 VITALS
OXYGEN SATURATION: 94 % | SYSTOLIC BLOOD PRESSURE: 164 MMHG | HEART RATE: 60 BPM | RESPIRATION RATE: 26 BRPM | BODY MASS INDEX: 33 KG/M2 | DIASTOLIC BLOOD PRESSURE: 76 MMHG | WEIGHT: 240.38 LBS

## 2022-10-11 DIAGNOSIS — N17.9 AKI (ACUTE KIDNEY INJURY) (HCC): ICD-10-CM

## 2022-10-11 DIAGNOSIS — R41.82 ALTERED MENTAL STATUS, UNSPECIFIED ALTERED MENTAL STATUS TYPE: Primary | ICD-10-CM

## 2022-10-11 LAB
ANION GAP SERPL CALC-SCNC: 6 MMOL/L (ref 0–18)
BASOPHILS # BLD AUTO: 0.02 X10(3) UL (ref 0–0.2)
BASOPHILS NFR BLD AUTO: 0.4 %
BILIRUB UR QL: NEGATIVE
BUN BLD-MCNC: 44 MG/DL (ref 7–18)
BUN/CREAT SERPL: 20.3 (ref 10–20)
CALCIUM BLD-MCNC: 8.4 MG/DL (ref 8.5–10.1)
CHLORIDE SERPL-SCNC: 107 MMOL/L (ref 98–112)
CLARITY UR: CLEAR
CO2 SERPL-SCNC: 28 MMOL/L (ref 21–32)
COLOR UR: YELLOW
CREAT BLD-MCNC: 2.17 MG/DL
DEPRECATED RDW RBC AUTO: 44.7 FL (ref 35.1–46.3)
EOSINOPHIL # BLD AUTO: 0.14 X10(3) UL (ref 0–0.7)
EOSINOPHIL NFR BLD AUTO: 2.6 %
ERYTHROCYTE [DISTWIDTH] IN BLOOD BY AUTOMATED COUNT: 12.3 % (ref 11–15)
GFR SERPLBLD BASED ON 1.73 SQ M-ARVRAT: 31 ML/MIN/1.73M2 (ref 60–?)
GLUCOSE BLD-MCNC: 234 MG/DL (ref 70–99)
GLUCOSE BLDC GLUCOMTR-MCNC: 231 MG/DL (ref 70–99)
GLUCOSE UR-MCNC: NEGATIVE MG/DL
HCT VFR BLD AUTO: 32.1 %
HGB BLD-MCNC: 10.3 G/DL
HYALINE CASTS #/AREA URNS AUTO: PRESENT /LPF
HYALINE CASTS #/AREA URNS AUTO: PRESENT /LPF
IMM GRANULOCYTES # BLD AUTO: 0.02 X10(3) UL (ref 0–1)
IMM GRANULOCYTES NFR BLD: 0.4 %
KETONES UR-MCNC: NEGATIVE MG/DL
LEUKOCYTE ESTERASE UR QL STRIP.AUTO: NEGATIVE
LYMPHOCYTES # BLD AUTO: 1.59 X10(3) UL (ref 1–4)
LYMPHOCYTES NFR BLD AUTO: 29.8 %
MCH RBC QN AUTO: 31.7 PG (ref 26–34)
MCHC RBC AUTO-ENTMCNC: 32.1 G/DL (ref 31–37)
MCV RBC AUTO: 98.8 FL
MONOCYTES # BLD AUTO: 0.49 X10(3) UL (ref 0.1–1)
MONOCYTES NFR BLD AUTO: 9.2 %
NEUTROPHILS # BLD AUTO: 3.08 X10 (3) UL (ref 1.5–7.7)
NEUTROPHILS # BLD AUTO: 3.08 X10(3) UL (ref 1.5–7.7)
NEUTROPHILS NFR BLD AUTO: 57.6 %
NITRITE UR QL STRIP.AUTO: NEGATIVE
OSMOLALITY SERPL CALC.SUM OF ELEC: 311 MOSM/KG (ref 275–295)
PH UR: 5.5 [PH] (ref 5–8)
PLATELET # BLD AUTO: 123 10(3)UL (ref 150–450)
POTASSIUM SERPL-SCNC: 5 MMOL/L (ref 3.5–5.1)
PROCALCITONIN SERPL-MCNC: 0.05 NG/ML (ref ?–0.16)
RBC # BLD AUTO: 3.25 X10(6)UL
SARS-COV-2 RNA RESP QL NAA+PROBE: NOT DETECTED
SODIUM SERPL-SCNC: 141 MMOL/L (ref 136–145)
SP GR UR STRIP: 1.01 (ref 1–1.03)
UROBILINOGEN UR STRIP-ACNC: 0.2
WBC # BLD AUTO: 5.3 X10(3) UL (ref 4–11)

## 2022-10-11 PROCEDURE — 70450 CT HEAD/BRAIN W/O DYE: CPT | Performed by: EMERGENCY MEDICINE

## 2022-10-11 PROCEDURE — 85025 COMPLETE CBC W/AUTO DIFF WBC: CPT | Performed by: EMERGENCY MEDICINE

## 2022-10-11 PROCEDURE — 93010 ELECTROCARDIOGRAM REPORT: CPT | Performed by: EMERGENCY MEDICINE

## 2022-10-11 PROCEDURE — 84145 PROCALCITONIN (PCT): CPT | Performed by: EMERGENCY MEDICINE

## 2022-10-11 PROCEDURE — 81015 MICROSCOPIC EXAM OF URINE: CPT | Performed by: EMERGENCY MEDICINE

## 2022-10-11 PROCEDURE — 82962 GLUCOSE BLOOD TEST: CPT

## 2022-10-11 PROCEDURE — 96360 HYDRATION IV INFUSION INIT: CPT

## 2022-10-11 PROCEDURE — 93005 ELECTROCARDIOGRAM TRACING: CPT

## 2022-10-11 PROCEDURE — 99285 EMERGENCY DEPT VISIT HI MDM: CPT

## 2022-10-11 PROCEDURE — 71045 X-RAY EXAM CHEST 1 VIEW: CPT | Performed by: EMERGENCY MEDICINE

## 2022-10-11 PROCEDURE — 81001 URINALYSIS AUTO W/SCOPE: CPT | Performed by: EMERGENCY MEDICINE

## 2022-10-11 PROCEDURE — 80048 BASIC METABOLIC PNL TOTAL CA: CPT | Performed by: EMERGENCY MEDICINE

## 2022-10-11 NOTE — ED INITIAL ASSESSMENT (HPI)
Patient presents to the ED via [facility] [facility] with altered mental status. Per EMS pt is usually A&O X4 but staff noticed he was lethargic and A&O x2. Last known well [id number] per EMS. MD at bedside.

## 2022-10-12 NOTE — ED QUICK NOTES
Spoke with Terri Arce at [facility], informed patient will be discharged and returned to facility upon Lauri

## 2022-10-19 ENCOUNTER — HOSPITAL ENCOUNTER (OUTPATIENT)
Facility: HOSPITAL | Age: 77
Setting detail: OBSERVATION
Discharge: NURSING, REHAB OR LONG-TERM CARE | End: 2022-10-21
Attending: EMERGENCY MEDICINE | Admitting: INTERNAL MEDICINE
Payer: MEDICARE

## 2022-10-19 ENCOUNTER — APPOINTMENT (OUTPATIENT)
Dept: CT IMAGING | Facility: HOSPITAL | Age: 77
End: 2022-10-19
Attending: EMERGENCY MEDICINE
Payer: MEDICARE

## 2022-10-19 DIAGNOSIS — R41.82 ALTERED MENTAL STATUS, UNSPECIFIED ALTERED MENTAL STATUS TYPE: Primary | ICD-10-CM

## 2022-10-19 LAB
ALBUMIN SERPL-MCNC: 3.1 G/DL (ref 3.4–5)
ALBUMIN/GLOB SERPL: 0.8 {RATIO} (ref 1–2)
ALP LIVER SERPL-CCNC: 146 U/L
ALT SERPL-CCNC: 21 U/L
ANION GAP SERPL CALC-SCNC: 7 MMOL/L (ref 0–18)
APTT PPP: 42 SECONDS (ref 23.3–35.6)
AST SERPL-CCNC: 23 U/L (ref 15–37)
BASOPHILS # BLD AUTO: 0.03 X10(3) UL (ref 0–0.2)
BASOPHILS NFR BLD AUTO: 0.6 %
BILIRUB SERPL-MCNC: 0.4 MG/DL (ref 0.1–2)
BILIRUB UR QL: NEGATIVE
BUN BLD-MCNC: 50 MG/DL (ref 7–18)
BUN/CREAT SERPL: 23.3 (ref 10–20)
CALCIUM BLD-MCNC: 9.3 MG/DL (ref 8.5–10.1)
CHLORIDE SERPL-SCNC: 107 MMOL/L (ref 98–112)
CLARITY UR: CLEAR
CO2 SERPL-SCNC: 23 MMOL/L (ref 21–32)
COLOR UR: YELLOW
CREAT BLD-MCNC: 2.15 MG/DL
DEPRECATED RDW RBC AUTO: 45.1 FL (ref 35.1–46.3)
EOSINOPHIL # BLD AUTO: 0.17 X10(3) UL (ref 0–0.7)
EOSINOPHIL NFR BLD AUTO: 3.4 %
ERYTHROCYTE [DISTWIDTH] IN BLOOD BY AUTOMATED COUNT: 12.4 % (ref 11–15)
GFR SERPLBLD BASED ON 1.73 SQ M-ARVRAT: 31 ML/MIN/1.73M2 (ref 60–?)
GLOBULIN PLAS-MCNC: 3.7 G/DL (ref 2.8–4.4)
GLUCOSE BLD-MCNC: 165 MG/DL (ref 70–99)
GLUCOSE BLDC GLUCOMTR-MCNC: 116 MG/DL (ref 70–99)
GLUCOSE UR-MCNC: NEGATIVE MG/DL
HCT VFR BLD AUTO: 32.3 %
HGB BLD-MCNC: 10.4 G/DL
HGB UR QL STRIP.AUTO: NEGATIVE
IMM GRANULOCYTES # BLD AUTO: 0.02 X10(3) UL (ref 0–1)
IMM GRANULOCYTES NFR BLD: 0.4 %
INR BLD: 1.33 (ref 0.85–1.16)
KETONES UR-MCNC: NEGATIVE MG/DL
LACTATE SERPL-SCNC: 1.6 MMOL/L (ref 0.4–2)
LEUKOCYTE ESTERASE UR QL STRIP.AUTO: NEGATIVE
LYMPHOCYTES # BLD AUTO: 1.7 X10(3) UL (ref 1–4)
LYMPHOCYTES NFR BLD AUTO: 33.7 %
MCH RBC QN AUTO: 31.7 PG (ref 26–34)
MCHC RBC AUTO-ENTMCNC: 32.2 G/DL (ref 31–37)
MCV RBC AUTO: 98.5 FL
MONOCYTES # BLD AUTO: 0.52 X10(3) UL (ref 0.1–1)
MONOCYTES NFR BLD AUTO: 10.3 %
NEUTROPHILS # BLD AUTO: 2.6 X10 (3) UL (ref 1.5–7.7)
NEUTROPHILS # BLD AUTO: 2.6 X10(3) UL (ref 1.5–7.7)
NEUTROPHILS NFR BLD AUTO: 51.6 %
NITRITE UR QL STRIP.AUTO: NEGATIVE
OSMOLALITY SERPL CALC.SUM OF ELEC: 301 MOSM/KG (ref 275–295)
PH UR: 5.5 [PH] (ref 5–8)
PLATELET # BLD AUTO: 139 10(3)UL (ref 150–450)
POTASSIUM SERPL-SCNC: 4 MMOL/L (ref 3.5–5.1)
PROT SERPL-MCNC: 6.8 G/DL (ref 6.4–8.2)
PROTHROMBIN TIME: 16.3 SECONDS (ref 11.6–14.8)
RBC # BLD AUTO: 3.28 X10(6)UL
SODIUM SERPL-SCNC: 137 MMOL/L (ref 136–145)
SP GR UR STRIP: 1.01 (ref 1–1.03)
UROBILINOGEN UR STRIP-ACNC: 0.2
WBC # BLD AUTO: 5 X10(3) UL (ref 4–11)

## 2022-10-19 PROCEDURE — 70450 CT HEAD/BRAIN W/O DYE: CPT | Performed by: EMERGENCY MEDICINE

## 2022-10-19 PROCEDURE — 99214 OFFICE O/P EST MOD 30 MIN: CPT | Performed by: OTHER

## 2022-10-19 RX ORDER — METOCLOPRAMIDE HYDROCHLORIDE 5 MG/ML
5 INJECTION INTRAMUSCULAR; INTRAVENOUS EVERY 8 HOURS PRN
Status: DISCONTINUED | OUTPATIENT
Start: 2022-10-19 | End: 2022-10-21

## 2022-10-19 RX ORDER — HYDRALAZINE HYDROCHLORIDE 20 MG/ML
10 INJECTION INTRAMUSCULAR; INTRAVENOUS EVERY 2 HOUR PRN
Status: DISCONTINUED | OUTPATIENT
Start: 2022-10-19 | End: 2022-10-21

## 2022-10-19 RX ORDER — ACETAMINOPHEN 650 MG/1
650 SUPPOSITORY RECTAL EVERY 4 HOURS PRN
Status: DISCONTINUED | OUTPATIENT
Start: 2022-10-19 | End: 2022-10-21

## 2022-10-19 RX ORDER — ENOXAPARIN SODIUM 100 MG/ML
40 INJECTION SUBCUTANEOUS DAILY
Status: DISCONTINUED | OUTPATIENT
Start: 2022-10-20 | End: 2022-10-21

## 2022-10-19 RX ORDER — LABETALOL HYDROCHLORIDE 5 MG/ML
10 INJECTION, SOLUTION INTRAVENOUS EVERY 10 MIN PRN
Status: DISCONTINUED | OUTPATIENT
Start: 2022-10-19 | End: 2022-10-21

## 2022-10-19 RX ORDER — SODIUM CHLORIDE 9 MG/ML
INJECTION, SOLUTION INTRAVENOUS CONTINUOUS
Status: DISCONTINUED | OUTPATIENT
Start: 2022-10-19 | End: 2022-10-20

## 2022-10-19 RX ORDER — ATORVASTATIN CALCIUM 80 MG/1
80 TABLET, FILM COATED ORAL NIGHTLY
Status: DISCONTINUED | OUTPATIENT
Start: 2022-10-19 | End: 2022-10-21

## 2022-10-19 RX ORDER — ACETAMINOPHEN 325 MG/1
650 TABLET ORAL EVERY 4 HOURS PRN
Status: DISCONTINUED | OUTPATIENT
Start: 2022-10-19 | End: 2022-10-21

## 2022-10-19 RX ORDER — ONDANSETRON 2 MG/ML
4 INJECTION INTRAMUSCULAR; INTRAVENOUS EVERY 6 HOURS PRN
Status: DISCONTINUED | OUTPATIENT
Start: 2022-10-19 | End: 2022-10-21

## 2022-10-19 RX ORDER — ASPIRIN 325 MG
325 TABLET ORAL DAILY
Status: DISCONTINUED | OUTPATIENT
Start: 2022-10-19 | End: 2022-10-21

## 2022-10-19 RX ORDER — ASPIRIN 300 MG/1
300 SUPPOSITORY RECTAL DAILY
Status: DISCONTINUED | OUTPATIENT
Start: 2022-10-19 | End: 2022-10-21

## 2022-10-19 NOTE — ED QUICK NOTES
Pt presents to the ED via EMS from [facility] for eval of slurred speech. Per NP from facility, pt has had slurred speech and AMS since 11:00. Requesting pt to be admitted under Dr. Denny Tan for complaints. On arrival, pt is talking in clear sentences. A&O x 3 at baseline. Swelling noted to BLE. R>L. Denies having any complaints. No CP or dyspnea noted.  Here for further eval.

## 2022-10-19 NOTE — CM/SW NOTE
Tried [employer] to find further information on the pt. I asked for the nurse and my call was sent to the . I tried calling Suri Whelan again and they were too busy to answer the phone and to leave a message. Dr Beckford Hind updated.

## 2022-10-19 NOTE — ED QUICK NOTES
Orders for admission, patient is aware of plan and ready to go upstairs. Any questions, please call ED RN Eriberto Rivera  at extension [phone]. Type of COVID test sent:Rapid  COVID Suspicion level: Low    Titratable drug(s) infusing:N/A  Rate:    LOC at time of transport:A&O x 2-3 at baseline.      Other pertinent information: Pt up with max assist.     CIWA score=  NIH score=

## 2022-10-19 NOTE — ED INITIAL ASSESSMENT (HPI)
PATIENT TO [facility] PATIENT'[facility] STAFF CALLED 911 DUE TO PATIENT HAVING INCREASED SLURRED SPEECH THAT RESOLVED, STATING PATIENT HAD INCREASED AMS X FEW DAYS. PATIENT HAS FOUL SMELLING URINE.

## 2022-10-20 ENCOUNTER — APPOINTMENT (OUTPATIENT)
Dept: MRI IMAGING | Facility: HOSPITAL | Age: 77
End: 2022-10-20
Attending: Other
Payer: MEDICARE

## 2022-10-20 ENCOUNTER — APPOINTMENT (OUTPATIENT)
Dept: CV DIAGNOSTICS | Facility: HOSPITAL | Age: 77
End: 2022-10-20
Attending: Other
Payer: MEDICARE

## 2022-10-20 LAB
CHOLEST SERPL-MCNC: 111 MG/DL (ref ?–200)
EST. AVERAGE GLUCOSE BLD GHB EST-MCNC: 157 MG/DL (ref 68–126)
GLUCOSE BLDC GLUCOMTR-MCNC: 120 MG/DL (ref 70–99)
GLUCOSE BLDC GLUCOMTR-MCNC: 194 MG/DL (ref 70–99)
GLUCOSE BLDC GLUCOMTR-MCNC: 212 MG/DL (ref 70–99)
HBA1C MFR BLD: 7.1 % (ref ?–5.7)
HDLC SERPL-MCNC: 33 MG/DL (ref 40–59)
LDLC SERPL CALC-MCNC: 56 MG/DL (ref ?–100)
NONHDLC SERPL-MCNC: 78 MG/DL (ref ?–130)
TRIGL SERPL-MCNC: 122 MG/DL (ref 30–149)
VLDLC SERPL CALC-MCNC: 18 MG/DL (ref 0–30)

## 2022-10-20 PROCEDURE — 70551 MRI BRAIN STEM W/O DYE: CPT | Performed by: OTHER

## 2022-10-20 PROCEDURE — 93306 TTE W/DOPPLER COMPLETE: CPT | Performed by: OTHER

## 2022-10-20 RX ORDER — NICOTINE POLACRILEX 4 MG
30 LOZENGE BUCCAL
Status: DISCONTINUED | OUTPATIENT
Start: 2022-10-20 | End: 2022-10-21

## 2022-10-20 RX ORDER — NICOTINE POLACRILEX 4 MG
15 LOZENGE BUCCAL
Status: DISCONTINUED | OUTPATIENT
Start: 2022-10-20 | End: 2022-10-21

## 2022-10-20 RX ORDER — DEXTROSE MONOHYDRATE 25 G/50ML
50 INJECTION, SOLUTION INTRAVENOUS
Status: DISCONTINUED | OUTPATIENT
Start: 2022-10-20 | End: 2022-10-21

## 2022-10-20 NOTE — PHYSICAL THERAPY NOTE
PHYSICAL THERAPY EVALUATION - INPATIENT     Room Number: 607/992-W  Evaluation Date: 10/20/2022  Type of Evaluation: Initial   Physician Order: PT Eval and Treat    Presenting Problem: Chronic infarct   Co-Morbidities : Major depressive disorder  Reason for Therapy: Mobility Dysfunction and Discharge Planning    PHYSICAL THERAPY ASSESSMENT     Patient is a 68year old male admitted 10/19/2022 for Chronic infarcts weakness. Patient's current functional deficits include decreased transfers, gait, balance, strength, limited memory and safety awareness, which are below the patient's pre-admission status. PPR worn per PT mask and goggles, pt wore mask. Pt ed with bed mobility and transfers with min A with a RW. Pt ed with gait progression 150' with CGA and verbal cues with decreased step length. Pt presents as distractible with decreased safety awareness. Pt ed with therex in a chair for ankle pumps LAQ's and marching x 10 reps in chair. Pt will benefit from ESVIN to regain his maximal PLOF and safety to reduce fall risk and an assisted living setting, group home or caregiver should be considered in the long run to assist with ADL's and mobility as indicated d/t chronic infarcts. The patient's Approx Degree of Impairment: 35.83% has been calculated based on documentation in the [facility] '6 clicks' Inpatient Basic Mobility Short Form. Research supports that patients with this level of impairment may benefit from ESVIN with PT as medical progress allows. Patient will benefit from continued IP PT services to address these deficits in preparation for discharge. DISCHARGE RECOMMENDATIONS  PT Discharge Recommendations: Sub-acute rehabilitation ([facility])    PLAN  PT Treatment Plan: Bed mobility; Body mechanics; Endurance; Energy conservation;Patient education;Gait training;Strengthening;Transfer training;Balance training  Rehab Potential : Good  Frequency (Obs): 3-5x/week       PHYSICAL THERAPY MEDICAL/SOCIAL HISTORY History related to current admission: AMS decreased safety awareness, MRI chronic brain infarcts     Problem List  Principal Problem:    Altered mental status, unspecified altered mental status type  Active Problems:    AMS (altered mental status)      HOME SITUATION  Home Situation  Type of Home: Skilled nursing facility  Drives: No  Patient Owned Equipment: Rolling walker     Prior Level of [place]: Mod indep with a RW in an apartment retired limited historian prior living environment unknown by patient. SUBJECTIVE  I feel ok and I am walking ok no pain just a bit weak. PHYSICAL THERAPY EXAMINATION     OBJECTIVE  Precautions: Bed/chair alarm  Fall Risk: Standard fall risk    WEIGHT BEARING RESTRICTION                PAIN ASSESSMENT  Ratin          COGNITION  Overall Cognitive Status:  Impaired A/O x 1 distractible decreased safety awareness      RANGE OF MOTION AND STRENGTH ASSESSMENT  Upper extremity ROM and strength are within functional limits 5/5  Lower extremity ROM is within functional limits   Lower extremity strength is within functional limits 4/5    BALANCE  Static Sitting: Fair +  Dynamic Sitting: Fair  Static Standing: Fair -  Dynamic Standing: Fair -    AM-PAC '6-Clicks' INPATIENT SHORT FORM - BASIC MOBILITY  How much difficulty does the patient currently have. .. Patient Difficulty: Turning over in bed (including adjusting bedclothes, sheets and blankets)?: None   Patient Difficulty: Sitting down on and standing up from a chair with arms (e.g., wheelchair, bedside commode, etc.): None   Patient Difficulty: Moving from lying on back to sitting on the side of the bed?: A Little   How much help from another person does the patient currently need. ..    Help from Another: Moving to and from a bed to a chair (including a wheelchair)?: A Little   Help from Another: Need to walk in hospital room?: A Little   Help from Another: Climbing 3-5 steps with a railing?: A Little     AM-PAC Score:  Raw Score: 20   Approx Degree of Impairment: 35.83%   Standardized Score (AM-PAC Scale): 47.67   CMS Modifier (G-Code): CJ    FUNCTIONAL ABILITY STATUS  Functional Mobility/Gait Assessment  Gait Assistance: Minimum assistance  Distance (ft): 150  Assistive Device: Rolling walker  Pattern:  (decreased step length and cadnece)    Bed Mobility: Min A     Transfers: Min A with RW    Exercise/Education Provided:  Bed mobility  Body mechanics  Energy conservation  Functional activity tolerated  Gait training  Posture  Strengthening  Lower therapeutic exercise: Ankle pumps  Heel slides  LAQ  Transfer training    Patient End of Session: Up in chair; With [facility] staff;Needs met;Call light within reach;RN aware of session/findings; All patient questions and concerns addressed; Alarm set    CURRENT GOALS    Goals to be met by: 10/30/2022  Patient Goal Patient's self-stated goal is: Return home    Goal #1 Patient is able to demonstrate supine - sit EOB @ level: independent     Goal #1   Current Status Min A   Goal #2 Patient is able to demonstrate transfers Sit to/from Stand at assistance level: modified independent with walker - rolling     Goal #2  Current Status Min A with RW   Goal #3 Patient is able to ambulate 300 feet with assist device: walker - rolling at assistance level: modified independent   Goal #3   Current Status Min A with ' step to gait unsteady and distractable   Goal #4    Goal #4   Current Status    Goal #5 Patient to demonstrate independence with home activity/exercise instructions provided to patient in preparation for discharge.    Goal #5   Current Status Ongoing   Goal #6    Goal #6  Current Status

## 2022-10-20 NOTE — PLAN OF CARE
Patient AOX2-3, more confused at night. Reorientation provided to surrounding. Denied pain or discomfort. Neuro check Q2H, no acute change. Passed dysphagia screening, no trouble swallowing. Up with assist with walker. Ambulate to the bathroom. On IVF 75ml/hr. Plan for MRI, ECHO and PT OT eval.    Problem: Patient Centered Care  Goal: Patient preferences are identified and integrated in the patient's plan of care  Description: Interventions:  - What would you like us to know as we care for you?  [place] assisted living  - Provide timely, complete, and accurate information to patient/family  - Incorporate patient and family knowledge, values, beliefs, and cultural backgrounds into the planning and delivery of care  - Encourage patient/family to participate in care and decision-making at the level they choose  - Honor patient and family perspectives and choices  Outcome: Progressing     Problem: Diabetes/Glucose Control  Goal: Glucose maintained within prescribed range  Description: INTERVENTIONS:  - Monitor Blood Glucose as ordered  - Assess for signs and symptoms of hyperglycemia and hypoglycemia  - Administer ordered medications to maintain glucose within target range  - Assess barriers to adequate nutritional intake and initiate nutrition consult as needed  - Instruct patient on self management of diabetes  Outcome: Progressing     Problem: Patient/Family Goals  Goal: Patient/Family Long Term Goal  Description: Patient's Long Term Goal: Discharge safely    Interventions:  - Cardiac monitoring  -Neuro check  -Electrolytes reviewed  -fall precaution   - See additional Care Plan goals for specific interventions  Outcome: Progressing  Goal: Patient/Family Short Term Goal  Description: Patient's Short Term Goal: Remained free from fall    Interventions:   - Cardiac monitoring  -neuro check  -fall precaution  - reoriented the patient  - See additional Care Plan goals for specific interventions  Outcome: Progressing Problem: CARDIOVASCULAR - ADULT  Goal: Maintains optimal cardiac output and hemodynamic stability  Description: INTERVENTIONS:  - Monitor vital signs, rhythm, and trends  - Monitor for bleeding, hypotension and signs of decreased cardiac output  - Evaluate effectiveness of vasoactive medications to optimize hemodynamic stability  - Monitor arterial and/or venous puncture sites for bleeding and/or hematoma  - Assess quality of pulses, skin color and temperature  - Assess for signs of decreased coronary artery perfusion - ex.  Angina  - Evaluate fluid balance, assess for edema, trend weights  Outcome: Progressing  Goal: Absence of cardiac arrhythmias or at baseline  Description: INTERVENTIONS:  - Continuous cardiac monitoring, monitor vital signs, obtain 12 lead EKG if indicated  - Evaluate effectiveness of antiarrhythmic and heart rate control medications as ordered  - Initiate emergency measures for life threatening arrhythmias  - Monitor electrolytes and administer replacement therapy as ordered  Outcome: Progressing     Problem: METABOLIC/FLUID AND ELECTROLYTES - ADULT  Goal: Glucose maintained within prescribed range  Description: INTERVENTIONS:  - Monitor Blood Glucose as ordered  - Assess for signs and symptoms of hyperglycemia and hypoglycemia  - Administer ordered medications to maintain glucose within target range  - Assess barriers to adequate nutritional intake and initiate nutrition consult as needed  - Instruct patient on self management of diabetes  Outcome: Progressing  Goal: Electrolytes maintained within normal limits  Description: INTERVENTIONS:  - Monitor labs and rhythm and assess patient for signs and symptoms of electrolyte imbalances  - Administer electrolyte replacement as ordered  - Monitor response to electrolyte replacements, including rhythm and repeat lab results as appropriate  - Fluid restriction as ordered  - Instruct patient on fluid and nutrition restrictions as appropriate  Outcome: Progressing  Goal: Hemodynamic stability and optimal renal function maintained  Description: INTERVENTIONS:  - Monitor labs and assess for signs and symptoms of volume excess or deficit  - Monitor intake, output and patient weight  - Monitor urine specific gravity, serum osmolarity and serum sodium as indicated or ordered  - Monitor response to interventions for patient's volume status, including labs, urine output, blood pressure (other measures as available)  - Encourage oral intake as appropriate  - Instruct patient on fluid and nutrition restrictions as appropriate  Outcome: Progressing     Problem: SKIN/TISSUE INTEGRITY - ADULT  Goal: Skin integrity remains intact  Description: INTERVENTIONS  - Assess and document risk factors for pressure ulcer development  - Assess and document skin integrity  - Monitor for areas of redness and/or skin breakdown  - Initiate interventions, skin care algorithm/standards of care as needed  Outcome: Progressing     Problem: Impaired Cognition  Goal: Patient will exhibit improved attention, thought processing and/or memory  Description: Interventions:  - Allow additional time for processing after asking questions or providing instructions  Outcome: Progressing

## 2022-10-20 NOTE — PLAN OF CARE
Patient alert and oriented x 2-3, patient has confusion at times. Vitals stable on room air. Patient denies pain. Patient is neuro checks Q4. Plan to continue to monitor with discharge once medically stable. Call light within reach. Problem: Patient Centered Care  Goal: Patient preferences are identified and integrated in the patient's plan of care  Description: Interventions:  - What would you like us to know as we care for you?  From [facility] assisted living  - Provide timely, complete, and accurate information to patient/family  - Incorporate patient and family knowledge, values, beliefs, and cultural backgrounds into the planning and delivery of care  - Encourage patient/family to participate in care and decision-making at the level they choose  - Honor patient and family perspectives and choices  Outcome: Progressing     Problem: Diabetes/Glucose Control  Goal: Glucose maintained within prescribed range  Description: INTERVENTIONS:  - Monitor Blood Glucose as ordered  - Assess for signs and symptoms of hyperglycemia and hypoglycemia  - Administer ordered medications to maintain glucose within target range  - Assess barriers to adequate nutritional intake and initiate nutrition consult as needed  - Instruct patient on self management of diabetes  Outcome: Progressing     Problem: Patient/Family Goals  Goal: Patient/Family Long Term Goal  Description: Patient's Long Term Goal: To be able to go home    Interventions:  - Medication administration  - diagnostic testing  - See additional Care Plan goals for specific interventions  Outcome: Progressing  Goal: Patient/Family Short Term Goal  Description: Patient's Short Term Goal: Increase mobility    Interventions:   - PT/OT  - See additional Care Plan goals for specific interventions  Outcome: Progressing     Problem: CARDIOVASCULAR - ADULT  Goal: Maintains optimal cardiac output and hemodynamic stability  Description: INTERVENTIONS:  - Monitor vital signs, rhythm, and trends  - Monitor for bleeding, hypotension and signs of decreased cardiac output  - Evaluate effectiveness of vasoactive medications to optimize hemodynamic stability  - Monitor arterial and/or venous puncture sites for bleeding and/or hematoma  - Assess quality of pulses, skin color and temperature  - Assess for signs of decreased coronary artery perfusion - ex.  Angina  - Evaluate fluid balance, assess for edema, trend weights  Outcome: Progressing  Goal: Absence of cardiac arrhythmias or at baseline  Description: INTERVENTIONS:  - Continuous cardiac monitoring, monitor vital signs, obtain 12 lead EKG if indicated  - Evaluate effectiveness of antiarrhythmic and heart rate control medications as ordered  - Initiate emergency measures for life threatening arrhythmias  - Monitor electrolytes and administer replacement therapy as ordered  Outcome: Progressing     Problem: METABOLIC/FLUID AND ELECTROLYTES - ADULT  Goal: Glucose maintained within prescribed range  Description: INTERVENTIONS:  - Monitor Blood Glucose as ordered  - Assess for signs and symptoms of hyperglycemia and hypoglycemia  - Administer ordered medications to maintain glucose within target range  - Assess barriers to adequate nutritional intake and initiate nutrition consult as needed  - Instruct patient on self management of diabetes  Outcome: Progressing  Goal: Electrolytes maintained within normal limits  Description: INTERVENTIONS:  - Monitor labs and rhythm and assess patient for signs and symptoms of electrolyte imbalances  - Administer electrolyte replacement as ordered  - Monitor response to electrolyte replacements, including rhythm and repeat lab results as appropriate  - Fluid restriction as ordered  - Instruct patient on fluid and nutrition restrictions as appropriate  Outcome: Progressing  Goal: Hemodynamic stability and optimal renal function maintained  Description: INTERVENTIONS:  - Monitor labs and assess for signs and symptoms of volume excess or deficit  - Monitor intake, output and patient weight  - Monitor urine specific gravity, serum osmolarity and serum sodium as indicated or ordered  - Monitor response to interventions for patient's volume status, including labs, urine output, blood pressure (other measures as available)  - Encourage oral intake as appropriate  - Instruct patient on fluid and nutrition restrictions as appropriate  Outcome: Progressing     Problem: SKIN/TISSUE INTEGRITY - ADULT  Goal: Skin integrity remains intact  Description: INTERVENTIONS  - Assess and document risk factors for pressure ulcer development  - Assess and document skin integrity  - Monitor for areas of redness and/or skin breakdown  - Initiate interventions, skin care algorithm/standards of care as needed  Outcome: Progressing     Problem: Impaired Cognition  Goal: Patient will exhibit improved attention, thought processing and/or memory  Description: Interventions:    Outcome: Progressing

## 2022-10-20 NOTE — PROGRESS NOTES
Brief neurology pn    Pt had aphasia   If mri negative then needs routine eeg and Metabolic workup     If new strokes then may need assisted-living, or some living situation. Where is adherence to the doac can be insured.

## 2022-10-20 NOTE — CM/SW NOTE
10/20/22 1300   CM/SW Referral Data   Referral Source    Reason for Referral Discharge planning   Informant Patient   Pertinent Medical Hx   Does patient have an established PCP? Yes  Elbert Dillon)   Patient Info   Patient's Current Mental Status at Time of Assessment Alert;Oriented   Patient's [facility] Acute Care Provider Upon Admission [facility] Nurs   Patient Status Prior to Admission   Independent with ADLs and Mobility No   Pt. requires assistance with Housework;Driving;Meals; Bathing;Medications; Toileting;Finances   Discharge Needs   Anticipated D/C needs Home health care   Choice of Post-Acute Provider   Informed patient of right to choose their preferred provider Yes     Pt discussed during nursing rounds. Dx AMS, r/o CVA. From [address]. PT/OT recommending ESVIN at dc, pt is not agreeable to dc recommendation. Pt is agreeable to [facility] w/PT and OT at [address] [facility] facility. [facility] referral sent to [address] [place] [address] who provides [facility] and therapy services to [facility] residents, F2F for RN, PT, and OT completed. [address]: [address] [place] [address] has accepted pt for RN and therapy services. Agency reserved in [address] and notified pt will be ready for dc in 1-2 days. Plan: [facility]ARDE w/Day GALLEGOS pendning medical clearance. / to remain available for support and/or discharge planning.      GEE Cole    [phone]

## 2022-10-21 VITALS
RESPIRATION RATE: 18 BRPM | TEMPERATURE: 98 F | HEART RATE: 55 BPM | BODY MASS INDEX: 31.67 KG/M2 | DIASTOLIC BLOOD PRESSURE: 70 MMHG | WEIGHT: 233.81 LBS | OXYGEN SATURATION: 97 % | SYSTOLIC BLOOD PRESSURE: 136 MMHG | HEIGHT: 72 IN

## 2022-10-21 LAB
GLUCOSE BLDC GLUCOMTR-MCNC: 129 MG/DL (ref 70–99)
GLUCOSE BLDC GLUCOMTR-MCNC: 156 MG/DL (ref 70–99)
GLUCOSE BLDC GLUCOMTR-MCNC: 157 MG/DL (ref 70–99)

## 2022-10-21 RX ORDER — ENOXAPARIN SODIUM 100 MG/ML
40 INJECTION SUBCUTANEOUS DAILY
Status: DISCONTINUED | OUTPATIENT
Start: 2022-10-21 | End: 2022-10-21

## 2022-10-21 RX ORDER — TAMSULOSIN HYDROCHLORIDE 0.4 MG/1
0.4 CAPSULE ORAL NIGHTLY
Status: DISCONTINUED | OUTPATIENT
Start: 2022-10-22 | End: 2022-10-21

## 2022-10-21 RX ORDER — FUROSEMIDE 20 MG/1
20 TABLET ORAL DAILY
Status: DISCONTINUED | OUTPATIENT
Start: 2022-10-21 | End: 2022-10-21

## 2022-10-21 RX ORDER — METHYLPHENIDATE HYDROCHLORIDE 5 MG/1
5 TABLET ORAL DAILY
Qty: 30 TABLET | Refills: 0 | Status: SHIPPED | OUTPATIENT
Start: 2022-10-21

## 2022-10-21 RX ORDER — TAMSULOSIN HYDROCHLORIDE 0.4 MG/1
0.4 CAPSULE ORAL NIGHTLY
Status: DISCONTINUED | OUTPATIENT
Start: 2022-10-21 | End: 2022-10-21

## 2022-10-21 RX ORDER — GABAPENTIN 250 MG/5ML
300 SOLUTION ORAL 3 TIMES DAILY
Status: DISCONTINUED | OUTPATIENT
Start: 2022-10-21 | End: 2022-10-21

## 2022-10-21 RX ORDER — FUROSEMIDE 20 MG/1
20 TABLET ORAL DAILY
Status: DISCONTINUED | OUTPATIENT
Start: 2022-10-22 | End: 2022-10-21

## 2022-10-21 RX ORDER — GABAPENTIN 250 MG/5ML
300 SOLUTION ORAL 3 TIMES DAILY
Status: DISCONTINUED | OUTPATIENT
Start: 2022-10-22 | End: 2022-10-21

## 2022-10-21 RX ORDER — HALOPERIDOL 1 MG/1
1 TABLET ORAL EVERY 8 HOURS PRN
Status: DISCONTINUED | OUTPATIENT
Start: 2022-10-21 | End: 2022-10-21

## 2022-10-21 RX ORDER — LISINOPRIL 2.5 MG/1
2.5 TABLET ORAL DAILY
Status: DISCONTINUED | OUTPATIENT
Start: 2022-10-21 | End: 2022-10-21

## 2022-10-21 RX ORDER — ASPIRIN 325 MG
325 TABLET ORAL DAILY
Qty: 30 TABLET | Refills: 0 | Status: SHIPPED | OUTPATIENT
Start: 2022-10-22

## 2022-10-21 RX ORDER — LISINOPRIL 2.5 MG/1
2.5 TABLET ORAL DAILY
Status: DISCONTINUED | OUTPATIENT
Start: 2022-10-22 | End: 2022-10-21

## 2022-10-21 RX ORDER — ASPIRIN 81 MG/1
81 TABLET, CHEWABLE ORAL DAILY
Status: DISCONTINUED | OUTPATIENT
Start: 2022-10-21 | End: 2022-10-21

## 2022-10-21 NOTE — PROGRESS NOTES
Patient is stable for discharge per neurology. No interval infarct. May benefit from being started on a cholinesterase inhibitor for dementia. Best to be evaluated in the outpatient setting. Neurology will sign off. Please page with questions. Full progress note to follow.

## 2022-10-21 NOTE — PLAN OF CARE
Tarsha Majano is A&Ox2-3 and forgetful. Patient on RA with no c/o of pain. Patient home meds re-started today. Patient on PO lasix and PO Eliquis. Neuro checks done per orders. Plan to discharge back to nursing facility once medically cleared.    Problem: Patient Centered Care  Goal: Patient preferences are identified and integrated in the patient's plan of care  Description: Interventions:  - What would you like us to know as we care for you?   - Provide timely, complete, and accurate information to patient/family  - Incorporate patient and family knowledge, values, beliefs, and cultural backgrounds into the planning and delivery of care  - Encourage patient/family to participate in care and decision-making at the level they choose  - Honor patient and family perspectives and choices  Outcome: Progressing     Problem: Diabetes/Glucose Control  Goal: Glucose maintained within prescribed range  Description: INTERVENTIONS:  - Monitor Blood Glucose as ordered  - Assess for signs and symptoms of hyperglycemia and hypoglycemia  - Administer ordered medications to maintain glucose within target range  - Assess barriers to adequate nutritional intake and initiate nutrition consult as needed  - Instruct patient on self management of diabetes  Outcome: Progressing     Problem: Patient/Family Goals  Goal: Patient/Family Long Term Goal  Description: Patient's Long Term Goal: to improve mobility    Interventions:  - follow plan of care  - See additional Care Plan goals for specific interventions  Outcome: Progressing  Goal: Patient/Family Short Term Goal  Description: Patient's Short Term Goal: to go home    Interventions:   - follow plan of care  - See additional Care Plan goals for specific interventions  Outcome: Progressing     Problem: CARDIOVASCULAR - ADULT  Goal: Maintains optimal cardiac output and hemodynamic stability  Description: INTERVENTIONS:  - Monitor vital signs, rhythm, and trends  - Monitor for bleeding, hypotension and signs of decreased cardiac output  - Evaluate effectiveness of vasoactive medications to optimize hemodynamic stability  - Monitor arterial and/or venous puncture sites for bleeding and/or hematoma  - Assess quality of pulses, skin color and temperature  - Assess for signs of decreased coronary artery perfusion - ex.  Angina  - Evaluate fluid balance, assess for edema, trend weights  Outcome: Progressing  Goal: Absence of cardiac arrhythmias or at baseline  Description: INTERVENTIONS:  - Continuous cardiac monitoring, monitor vital signs, obtain 12 lead EKG if indicated  - Evaluate effectiveness of antiarrhythmic and heart rate control medications as ordered  - Initiate emergency measures for life threatening arrhythmias  - Monitor electrolytes and administer replacement therapy as ordered  Outcome: Progressing     Problem: METABOLIC/FLUID AND ELECTROLYTES - ADULT  Goal: Glucose maintained within prescribed range  Description: INTERVENTIONS:  - Monitor Blood Glucose as ordered  - Assess for signs and symptoms of hyperglycemia and hypoglycemia  - Administer ordered medications to maintain glucose within target range  - Assess barriers to adequate nutritional intake and initiate nutrition consult as needed  - Instruct patient on self management of diabetes  Outcome: Progressing  Goal: Electrolytes maintained within normal limits  Description: INTERVENTIONS:  - Monitor labs and rhythm and assess patient for signs and symptoms of electrolyte imbalances  - Administer electrolyte replacement as ordered  - Monitor response to electrolyte replacements, including rhythm and repeat lab results as appropriate  - Fluid restriction as ordered  - Instruct patient on fluid and nutrition restrictions as appropriate  Outcome: Progressing  Goal: Hemodynamic stability and optimal renal function maintained  Description: INTERVENTIONS:  - Monitor labs and assess for signs and symptoms of volume excess or deficit  - Monitor intake, output and patient weight  - Monitor urine specific gravity, serum osmolarity and serum sodium as indicated or ordered  - Monitor response to interventions for patient's volume status, including labs, urine output, blood pressure (other measures as available)  - Encourage oral intake as appropriate  - Instruct patient on fluid and nutrition restrictions as appropriate  Outcome: Progressing     Problem: SKIN/TISSUE INTEGRITY - ADULT  Goal: Skin integrity remains intact  Description: INTERVENTIONS  - Assess and document risk factors for pressure ulcer development  - Assess and document skin integrity  - Monitor for areas of redness and/or skin breakdown  - Initiate interventions, skin care algorithm/standards of care as needed  Outcome: Progressing     Problem: Impaired Cognition  Goal: Patient will exhibit improved attention, thought processing and/or memory  Description: Interventions:    Outcome: Progressing

## 2022-10-21 NOTE — PLAN OF CARE
Patient has intermittent confusion. Reorientation provided. Neurocheck performed, no acute changes. Denied pain or discomfort. Up with assist ambulate to the bathroom with walker. Home meds restarted. IV fluid discontinue. Fall precaution maintained. Problem: Patient Centered Care  Goal: Patient preferences are identified and integrated in the patient's plan of care  Description: Interventions:  - What would you like us to know as we care for you?  From [facility]  - Provide timely, complete, and accurate information to patient/family  - Incorporate patient and family knowledge, values, beliefs, and cultural backgrounds into the planning and delivery of care  - Encourage patient/family to participate in care and decision-making at the level they choose  - Honor patient and family perspectives and choices  10/21/2022 0056 by Lindsay Barnes RN  Outcome: Progressing  10/20/2022 2340 by Lindsay Barnes RN  Outcome: Progressing     Problem: Diabetes/Glucose Control  Goal: Glucose maintained within prescribed range  Description: INTERVENTIONS:  - Monitor Blood Glucose as ordered  - Assess for signs and symptoms of hyperglycemia and hypoglycemia  - Administer ordered medications to maintain glucose within target range  - Assess barriers to adequate nutritional intake and initiate nutrition consult as needed  - Instruct patient on self management of diabetes  10/21/2022 0056 by Lindsay Barnes RN  Outcome: Progressing  10/20/2022 2340 by Lindsay Barnes RN  Outcome: Progressing     Problem: Patient/Family Goals  Goal: Patient/Family Long Term Goal  Description: Patient's Long Term Goal: Safely discharge    Interventions:  - cardiac monitoring  -VS monitoring  -Electrolytes replacement  -PT/OT eval    - See additional Care Plan goals for specific interventions  10/21/2022 0056 by Lindsay Barnes RN  Outcome: Progressing  10/20/2022 2340 by Lindsay Barnes RN  Outcome: Progressing  Goal: Patient/Family Short Term Goal  Description: Patient's Short Term Goal: Hemodynamically stable    Interventions:   - Cardiac monitoring  - VS monitoring  -Electrolytes replacement  -Fall precaution  -neurocheck  - See additional Care Plan goals for specific interventions  10/21/2022 0056 by Hayes Powell RN  Outcome: Progressing  10/20/2022 2340 by Hayes Powell RN  Outcome: Progressing     Problem: CARDIOVASCULAR - ADULT  Goal: Maintains optimal cardiac output and hemodynamic stability  Description: INTERVENTIONS:  - Monitor vital signs, rhythm, and trends  - Monitor for bleeding, hypotension and signs of decreased cardiac output  - Evaluate effectiveness of vasoactive medications to optimize hemodynamic stability  - Monitor arterial and/or venous puncture sites for bleeding and/or hematoma  - Assess quality of pulses, skin color and temperature  - Assess for signs of decreased coronary artery perfusion - ex.  Angina  - Evaluate fluid balance, assess for edema, trend weights  10/21/2022 0056 by Hayes Powell RN  Outcome: Progressing  10/20/2022 2340 by Hayes Powell RN  Outcome: Progressing  Goal: Absence of cardiac arrhythmias or at baseline  Description: INTERVENTIONS:  - Continuous cardiac monitoring, monitor vital signs, obtain 12 lead EKG if indicated  - Evaluate effectiveness of antiarrhythmic and heart rate control medications as ordered  - Initiate emergency measures for life threatening arrhythmias  - Monitor electrolytes and administer replacement therapy as ordered  10/21/2022 0056 by Hayes Powell RN  Outcome: Progressing  10/20/2022 2340 by Hayes Powell RN  Outcome: Progressing     Problem: METABOLIC/FLUID AND ELECTROLYTES - ADULT  Goal: Glucose maintained within prescribed range  Description: INTERVENTIONS:  - Monitor Blood Glucose as ordered  - Assess for signs and symptoms of hyperglycemia and hypoglycemia  - Administer ordered medications to maintain glucose within target range  - Assess barriers to adequate nutritional intake and initiate nutrition consult as needed  - Instruct patient on self management of diabetes  10/21/2022 0056 by Vicki Nino RN  Outcome: Progressing  10/20/2022 2340 by Vicki Nino RN  Outcome: Progressing  Goal: Electrolytes maintained within normal limits  Description: INTERVENTIONS:  - Monitor labs and rhythm and assess patient for signs and symptoms of electrolyte imbalances  - Administer electrolyte replacement as ordered  - Monitor response to electrolyte replacements, including rhythm and repeat lab results as appropriate  - Fluid restriction as ordered  - Instruct patient on fluid and nutrition restrictions as appropriate  10/21/2022 0056 by Vicki Nino RN  Outcome: Progressing  10/20/2022 2340 by Vicki Nino RN  Outcome: Progressing  Goal: Hemodynamic stability and optimal renal function maintained  Description: INTERVENTIONS:  - Monitor labs and assess for signs and symptoms of volume excess or deficit  - Monitor intake, output and patient weight  - Monitor urine specific gravity, serum osmolarity and serum sodium as indicated or ordered  - Monitor response to interventions for patient's volume status, including labs, urine output, blood pressure (other measures as available)  - Encourage oral intake as appropriate  - Instruct patient on fluid and nutrition restrictions as appropriate  10/21/2022 0056 by Vicki Nino RN  Outcome: Progressing  10/20/2022 2340 by Vicki Nino RN  Outcome: Progressing     Problem: SKIN/TISSUE INTEGRITY - ADULT  Goal: Skin integrity remains intact  Description: INTERVENTIONS  - Assess and document risk factors for pressure ulcer development  - Assess and document skin integrity  - Monitor for areas of redness and/or skin breakdown  - Initiate interventions, skin care algorithm/standards of care as needed  10/21/2022 0056 by Vicki Nino RN  Outcome: Progressing  10/20/2022 2340 by Vicki Nino RN  Outcome: Progressing     Problem: Impaired Cognition  Goal: Patient will exhibit improved attention, thought processing and/or memory  Description: Interventions:  - Minimize distractions in the room when full attention is required  10/21/2022 0056 by Hayes Powell RN  Outcome: Progressing  10/20/2022 2340 by Hayes Powell RN  Outcome: Progressing

## 2022-10-21 NOTE — DISCHARGE SUMMARY
[facility] AND [facility]  Discharge Summary    Cydney Lyons Patient Status:  Observation    1945 MRN [id number]   Location [facility]5W Attending Frantz Anderson MD   Hosp Day # 0 PCP Harley Naqvi     Date of Admission: 10/19/2022    Date of Discharge: 10/21/22      Admitting Diagnosis: Altered mental status, unspecified altered mental status type [R41.82]    Discharge Diagnosis: Patient Active Problem List:     Visual changes     Visual hallucination     Thrombocytopenia (HCC)     Anemia     Acute on chronic congestive heart failure (HCC)     Lower extremity edema     Pneumatosis of intestines     Nausea and vomiting, intractability of vomiting not specified, unspecified vomiting type     Major depressive disorder, single episode, severe ([address]     AMS (altered mental status)     Altered mental status, unspecified altered mental status type     Aphasia     Chronic arterial ischemic stroke      Reason for Admission: ***    Physical Exam: {exam; complete normal and system select:62475}    History of Present Illness: ***    Hospital Course: ***    Consultations: ***    Procedures: ***    Complications: ***    Disposition: SNF    Discharge Condition: {DISCHARGE CONDITION:}    Discharge Medications: Current Discharge Medication List    START taking these medications    aspirin 325 MG Oral Tab  Take 1 tablet (325 mg total) by mouth daily. Qty: 30 tablet Refills: 0      CONTINUE these medications which have CHANGED    methylphenidate 5 MG Oral Tab  Take 1 tablet (5 mg total) by mouth daily. Qty: 30 tablet Refills: 0      CONTINUE these medications which have NOT CHANGED    escitalopram 5 MG Oral Tab  Take 1 tablet (5 mg total) by mouth nightly. Watch severe muscle stiffness/fever  Qty: 30 tablet Refills: 0    hydrocortisone 1 % External Cream  Apply 1 Application topically 2 (two) times daily as needed.   Qty: 1 each Refills: 0    magnesium oxide 400 MG Oral Tab  Take 1 tablet (400 mg total) by mouth daily. Take with food hold if diarrhea  Qty: 5 tablet Refills: 0    insulin glargine 100 UNIT/ML Subcutaneous Solution Pen-injector  Inject 7 Units into the skin nightly. New dose  Qty: 1 each Refills: 0    Potassium Chloride ER 20 MEQ Oral Tab CR  Take 1 tablet by mouth every other day. Qty: 60 tablet Refills: 0    furosemide 20 MG Oral Tab  Take 1 tablet (20 mg total) by mouth daily. Qty: 30 tablet Refills: 0    Pentoxifylline  MG Oral Tab CR  Take 1 tablet (400 mg total) by mouth 2 (two) times a day. Refills: 0    aspirin 81 MG Oral Chew Tab  Chew 81 mg by mouth daily. apixaban 5 MG Oral Tab  Take 5 mg by mouth 2 (two) times daily. finasteride 5 MG Oral Tab  Take 5 mg by mouth nightly. SITagliptin Phosphate 50 MG Oral Tab  Take 50 mg by mouth daily. metoprolol Tartrate 25 MG Oral Tab  Take 25 mg by mouth 2 (two) times daily. Oxybutynin Chloride ER 5 MG Oral Tablet 24 Hr  Take 5 mg by mouth daily. tamsulosin (FLOMAX) cap  Take 0.4 mg by mouth nightly. Pyridoxine HCl (VITAMIN B-6, PYRIDOXINE,) 100 MG Oral Tab  Take 100 mg by mouth daily. Vitamin D3, Cholecalciferol, 25 MCG (1000 UT) Oral Tab  Take 1,000 Units by mouth daily. lisinopril 5 MG Oral Tab  Take 2.5 mg by mouth daily. Atorvastatin Calcium (LIPITOR OR)  Take 40 mg by mouth. GABAPENTIN OR  Take 300 mg by mouth in the morning, at noon, and at bedtime. Associated Diagnoses:Visual hallucination; Visual changes          Follow up Visits:  Follow-up with *** in {0-10:72195} {units:19995}    Follow up Labs: *** in {0-10:01537} {units:19995}    Other Discharge Instructions: ***    Marely Carlos MD  10/21/2022  9:51 AM Oral Tab CR  Take 1 tablet (400 mg total) by mouth 2 (two) times a day. Refills: 0    aspirin 81 MG Oral Chew Tab  Chew 81 mg by mouth daily. apixaban 5 MG Oral Tab  Take 5 mg by mouth 2 (two) times daily. finasteride 5 MG Oral Tab  Take 5 mg by mouth nightly. SITagliptin Phosphate 50 MG Oral Tab  Take 50 mg by mouth daily. metoprolol Tartrate 25 MG Oral Tab  Take 25 mg by mouth 2 (two) times daily. Oxybutynin Chloride ER 5 MG Oral Tablet 24 Hr  Take 5 mg by mouth daily. tamsulosin (FLOMAX) cap  Take 0.4 mg by mouth nightly. Pyridoxine HCl (VITAMIN B-6, PYRIDOXINE,) 100 MG Oral Tab  Take 100 mg by mouth daily. Vitamin D3, Cholecalciferol, 25 MCG (1000 UT) Oral Tab  Take 1,000 Units by mouth daily. lisinopril 5 MG Oral Tab  Take 2.5 mg by mouth daily. Atorvastatin Calcium (LIPITOR OR)  Take 40 mg by mouth. GABAPENTIN OR  Take 300 mg by mouth in the morning, at noon, and at bedtime. Associated Diagnoses:Visual hallucination; Visual changes      Follow up Visits: Follow-up with PCP in 1 week, follow up with neurology as instructed.        Brandee Daugherty MD  10/21/2022  9:51 AM

## 2022-10-21 NOTE — CM/SW NOTE
10/21/22 1057   Discharge disposition   Expected discharge disposition [facility]   Post Acute Care Provider Chayo Marc  ([address] )   Discharge transportation [facility]     Pt discussed during nursing rounds. Pt is stable for dc today. MD ID order entered. Pt continues to refuse ESVIN despite recommendation. Pt will return to [address] today, facility notified of patient return today. [address] will provide RN and therapy services at ID, agency notified of patient's return to [address] today. Pt requesting assistance w/transport home. [facility] scheduled for 3pm . Plan: [address] w/[facility] today. / to remain available for support and/or discharge planning.      GEE Manuel    [phone]

## 2022-10-21 NOTE — PLAN OF CARE
Leilani Godfrey is A&Ox3 forgetful at times. Patient is being discharge back to [address] [place] [address]. Report given to [facility] and all questions and concerned answered. IV and telebox removed. Patient is being transported via ambulance.

## 2022-11-11 ENCOUNTER — HOSPITAL ENCOUNTER (EMERGENCY)
Facility: HOSPITAL | Age: 77
Discharge: HOME | End: 2022-11-11
Attending: EMERGENCY MEDICINE
Payer: MEDICARE

## 2022-11-11 ENCOUNTER — APPOINTMENT (OUTPATIENT)
Dept: GENERAL RADIOLOGY | Facility: HOSPITAL | Age: 77
End: 2022-11-11
Attending: EMERGENCY MEDICINE
Payer: MEDICARE

## 2022-11-11 ENCOUNTER — APPOINTMENT (OUTPATIENT)
Dept: MRI IMAGING | Facility: HOSPITAL | Age: 77
End: 2022-11-11
Attending: EMERGENCY MEDICINE
Payer: MEDICARE

## 2022-11-11 VITALS
DIASTOLIC BLOOD PRESSURE: 70 MMHG | WEIGHT: 240 LBS | HEART RATE: 57 BPM | TEMPERATURE: 98 F | BODY MASS INDEX: 33 KG/M2 | OXYGEN SATURATION: 96 % | RESPIRATION RATE: 15 BRPM | SYSTOLIC BLOOD PRESSURE: 149 MMHG

## 2022-11-11 DIAGNOSIS — Z00.00 GENERAL MEDICAL EXAMINATION: Primary | ICD-10-CM

## 2022-11-11 LAB
AMPHET UR QL SCN: NEGATIVE
ANION GAP SERPL CALC-SCNC: 8 MMOL/L (ref 0–18)
APAP SERPL-MCNC: <2 UG/ML (ref 10–30)
ATRIAL RATE: 61 BPM
BASOPHILS # BLD AUTO: 0.01 X10(3) UL (ref 0–0.2)
BASOPHILS NFR BLD AUTO: 0.1 %
BENZODIAZ UR QL SCN: NEGATIVE
BILIRUB UR QL: NEGATIVE
BUN BLD-MCNC: 51 MG/DL (ref 7–18)
BUN/CREAT SERPL: 23.1 (ref 10–20)
CALCIUM BLD-MCNC: 8.8 MG/DL (ref 8.5–10.1)
CANNABINOIDS UR QL SCN: NEGATIVE
CHLORIDE SERPL-SCNC: 104 MMOL/L (ref 98–112)
CO2 SERPL-SCNC: 26 MMOL/L (ref 21–32)
COCAINE UR QL: NEGATIVE
COLOR UR: YELLOW
CREAT BLD-MCNC: 2.21 MG/DL
CREAT UR-SCNC: 134 MG/DL
DEPRECATED RDW RBC AUTO: 46 FL (ref 35.1–46.3)
EOSINOPHIL # BLD AUTO: 0.16 X10(3) UL (ref 0–0.7)
EOSINOPHIL NFR BLD AUTO: 2.4 %
ERYTHROCYTE [DISTWIDTH] IN BLOOD BY AUTOMATED COUNT: 12.8 % (ref 11–15)
ETHANOL SERPL-MCNC: <3 MG/DL (ref ?–3)
FLUAV + FLUBV RNA SPEC NAA+PROBE: NEGATIVE
FLUAV + FLUBV RNA SPEC NAA+PROBE: NEGATIVE
GFR SERPLBLD BASED ON 1.73 SQ M-ARVRAT: 30 ML/MIN/1.73M2 (ref 60–?)
GLUCOSE BLD-MCNC: 168 MG/DL (ref 70–99)
GLUCOSE BLDC GLUCOMTR-MCNC: 164 MG/DL (ref 70–99)
GLUCOSE UR-MCNC: NEGATIVE MG/DL
HCT VFR BLD AUTO: 33.3 %
HGB BLD-MCNC: 10.7 G/DL
HGB UR QL STRIP.AUTO: NEGATIVE
HYALINE CASTS #/AREA URNS AUTO: PRESENT /LPF
IMM GRANULOCYTES # BLD AUTO: 0.02 X10(3) UL (ref 0–1)
IMM GRANULOCYTES NFR BLD: 0.3 %
KETONES UR-MCNC: NEGATIVE MG/DL
LACTATE SERPL-SCNC: 1.7 MMOL/L (ref 0.4–2)
LEUKOCYTE ESTERASE UR QL STRIP.AUTO: NEGATIVE
LYMPHOCYTES # BLD AUTO: 1.79 X10(3) UL (ref 1–4)
LYMPHOCYTES NFR BLD AUTO: 26.6 %
MCH RBC QN AUTO: 31.7 PG (ref 26–34)
MCHC RBC AUTO-ENTMCNC: 32.1 G/DL (ref 31–37)
MCV RBC AUTO: 98.5 FL
MDMA UR QL SCN: NEGATIVE
MONOCYTES # BLD AUTO: 0.58 X10(3) UL (ref 0.1–1)
MONOCYTES NFR BLD AUTO: 8.6 %
NEUTROPHILS # BLD AUTO: 4.18 X10 (3) UL (ref 1.5–7.7)
NEUTROPHILS # BLD AUTO: 4.18 X10(3) UL (ref 1.5–7.7)
NEUTROPHILS NFR BLD AUTO: 62 %
NITRITE UR QL STRIP.AUTO: NEGATIVE
OPIATES UR QL SCN: NEGATIVE
OSMOLALITY SERPL CALC.SUM OF ELEC: 304 MOSM/KG (ref 275–295)
OXYCODONE UR QL SCN: NEGATIVE
P AXIS: 58 DEGREES
P-R INTERVAL: 264 MS
PH UR: 5 [PH] (ref 5–8)
PLATELET # BLD AUTO: 151 10(3)UL (ref 150–450)
POTASSIUM SERPL-SCNC: 4.4 MMOL/L (ref 3.5–5.1)
PROT UR-MCNC: 100 MG/DL
Q-T INTERVAL: 418 MS
QRS DURATION: 106 MS
QTC CALCULATION (BEZET): 420 MS
R AXIS: -25 DEGREES
RBC # BLD AUTO: 3.38 X10(6)UL
RSV RNA SPEC NAA+PROBE: NEGATIVE
SALICYLATES SERPL-MCNC: <1.7 MG/DL (ref 2.8–20)
SARS-COV-2 RNA RESP QL NAA+PROBE: NOT DETECTED
SODIUM SERPL-SCNC: 138 MMOL/L (ref 136–145)
SP GR UR STRIP: 1.01 (ref 1–1.03)
T AXIS: 106 DEGREES
UROBILINOGEN UR STRIP-ACNC: <2
VENTRICULAR RATE: 61 BPM
VIT C UR-MCNC: NEGATIVE MG/DL
WBC # BLD AUTO: 6.7 X10(3) UL (ref 4–11)

## 2022-11-11 PROCEDURE — 80307 DRUG TEST PRSMV CHEM ANLYZR: CPT | Performed by: EMERGENCY MEDICINE

## 2022-11-11 PROCEDURE — 85025 COMPLETE CBC W/AUTO DIFF WBC: CPT | Performed by: EMERGENCY MEDICINE

## 2022-11-11 PROCEDURE — 83605 ASSAY OF LACTIC ACID: CPT | Performed by: EMERGENCY MEDICINE

## 2022-11-11 PROCEDURE — 0241U SARS-COV-2/FLU A AND B/RSV BY PCR (GENEXPERT): CPT | Performed by: EMERGENCY MEDICINE

## 2022-11-11 PROCEDURE — 99284 EMERGENCY DEPT VISIT MOD MDM: CPT

## 2022-11-11 PROCEDURE — 80143 DRUG ASSAY ACETAMINOPHEN: CPT | Performed by: EMERGENCY MEDICINE

## 2022-11-11 PROCEDURE — 82962 GLUCOSE BLOOD TEST: CPT

## 2022-11-11 PROCEDURE — 70551 MRI BRAIN STEM W/O DYE: CPT | Performed by: EMERGENCY MEDICINE

## 2022-11-11 PROCEDURE — 80048 BASIC METABOLIC PNL TOTAL CA: CPT | Performed by: EMERGENCY MEDICINE

## 2022-11-11 PROCEDURE — 81001 URINALYSIS AUTO W/SCOPE: CPT | Performed by: EMERGENCY MEDICINE

## 2022-11-11 PROCEDURE — 82077 ASSAY SPEC XCP UR&BREATH IA: CPT | Performed by: EMERGENCY MEDICINE

## 2022-11-11 PROCEDURE — 71045 X-RAY EXAM CHEST 1 VIEW: CPT | Performed by: EMERGENCY MEDICINE

## 2022-11-11 PROCEDURE — 99285 EMERGENCY DEPT VISIT HI MDM: CPT

## 2022-11-11 PROCEDURE — 93005 ELECTROCARDIOGRAM TRACING: CPT

## 2022-11-11 PROCEDURE — 36415 COLL VENOUS BLD VENIPUNCTURE: CPT

## 2022-11-11 PROCEDURE — 80179 DRUG ASSAY SALICYLATE: CPT | Performed by: EMERGENCY MEDICINE

## 2022-11-11 PROCEDURE — 93010 ELECTROCARDIOGRAM REPORT: CPT | Performed by: EMERGENCY MEDICINE

## 2022-11-11 NOTE — ED INITIAL ASSESSMENT (HPI)
Patient arrives via ems from [facility] for altered mental status. Per ems, patient is reported to be a/o x 4 at baseline, and starting today has been noted to be confused/speech inapropriate from baseline. Patient told his speech therapist that he \"wants to kill the person that took his cat\".  Denies SI/HI on arrival

## 2022-11-12 NOTE — DISCHARGE INSTRUCTIONS
Follow-up with the neurologist who was assessing you during your hospitalization earlier this month. In addition you should be evaluated for possible dementia on the outpatient setting.

## 2022-11-17 ENCOUNTER — HOSPITAL ENCOUNTER (EMERGENCY)
Facility: HOSPITAL | Age: 77
Discharge: HOME | End: 2022-11-17
Attending: EMERGENCY MEDICINE
Payer: MEDICARE

## 2022-11-17 ENCOUNTER — APPOINTMENT (OUTPATIENT)
Dept: CT IMAGING | Facility: HOSPITAL | Age: 77
End: 2022-11-17
Attending: EMERGENCY MEDICINE
Payer: MEDICARE

## 2022-11-17 VITALS
DIASTOLIC BLOOD PRESSURE: 73 MMHG | OXYGEN SATURATION: 99 % | BODY MASS INDEX: 33 KG/M2 | RESPIRATION RATE: 22 BRPM | WEIGHT: 244.69 LBS | TEMPERATURE: 98 F | SYSTOLIC BLOOD PRESSURE: 165 MMHG | HEART RATE: 61 BPM

## 2022-11-17 DIAGNOSIS — R40.4 TRANSIENT ALTERATION OF AWARENESS: Primary | ICD-10-CM

## 2022-11-17 LAB
ANION GAP SERPL CALC-SCNC: 4 MMOL/L (ref 0–18)
BASOPHILS # BLD AUTO: 0.02 X10(3) UL (ref 0–0.2)
BASOPHILS NFR BLD AUTO: 0.3 %
BILIRUB UR QL: NEGATIVE
BUN BLD-MCNC: 56 MG/DL (ref 7–18)
BUN/CREAT SERPL: 29.8 (ref 10–20)
CALCIUM BLD-MCNC: 9.3 MG/DL (ref 8.5–10.1)
CHLORIDE SERPL-SCNC: 102 MMOL/L (ref 98–112)
CLARITY UR: CLEAR
CO2 SERPL-SCNC: 29 MMOL/L (ref 21–32)
COLOR UR: YELLOW
CREAT BLD-MCNC: 1.88 MG/DL
DEPRECATED RDW RBC AUTO: 46.2 FL (ref 35.1–46.3)
EOSINOPHIL # BLD AUTO: 0.12 X10(3) UL (ref 0–0.7)
EOSINOPHIL NFR BLD AUTO: 1.6 %
ERYTHROCYTE [DISTWIDTH] IN BLOOD BY AUTOMATED COUNT: 12.4 % (ref 11–15)
GFR SERPLBLD BASED ON 1.73 SQ M-ARVRAT: 36 ML/MIN/1.73M2 (ref 60–?)
GLUCOSE BLD-MCNC: 129 MG/DL (ref 70–99)
GLUCOSE BLDC GLUCOMTR-MCNC: 147 MG/DL (ref 70–99)
GLUCOSE UR-MCNC: NEGATIVE MG/DL
HCT VFR BLD AUTO: 33.1 %
HGB BLD-MCNC: 10.5 G/DL
HGB UR QL STRIP.AUTO: NEGATIVE
IMM GRANULOCYTES # BLD AUTO: 0.02 X10(3) UL (ref 0–1)
IMM GRANULOCYTES NFR BLD: 0.3 %
KETONES UR-MCNC: NEGATIVE MG/DL
LEUKOCYTE ESTERASE UR QL STRIP.AUTO: NEGATIVE
LYMPHOCYTES # BLD AUTO: 1.89 X10(3) UL (ref 1–4)
LYMPHOCYTES NFR BLD AUTO: 25.6 %
MCH RBC QN AUTO: 31.7 PG (ref 26–34)
MCHC RBC AUTO-ENTMCNC: 31.7 G/DL (ref 31–37)
MCV RBC AUTO: 100 FL
MONOCYTES # BLD AUTO: 0.66 X10(3) UL (ref 0.1–1)
MONOCYTES NFR BLD AUTO: 8.9 %
NEUTROPHILS # BLD AUTO: 4.67 X10 (3) UL (ref 1.5–7.7)
NEUTROPHILS # BLD AUTO: 4.67 X10(3) UL (ref 1.5–7.7)
NEUTROPHILS NFR BLD AUTO: 63.3 %
NITRITE UR QL STRIP.AUTO: NEGATIVE
OSMOLALITY SERPL CALC.SUM OF ELEC: 297 MOSM/KG (ref 275–295)
PH UR: 5.5 [PH] (ref 5–8)
PLATELET # BLD AUTO: 139 10(3)UL (ref 150–450)
POTASSIUM SERPL-SCNC: 4.6 MMOL/L (ref 3.5–5.1)
RBC # BLD AUTO: 3.31 X10(6)UL
SARS-COV-2 RNA RESP QL NAA+PROBE: NOT DETECTED
SODIUM SERPL-SCNC: 135 MMOL/L (ref 136–145)
SP GR UR STRIP: 1.01 (ref 1–1.03)
UROBILINOGEN UR STRIP-ACNC: 0.2
WBC # BLD AUTO: 7.4 X10(3) UL (ref 4–11)

## 2022-11-17 PROCEDURE — 81001 URINALYSIS AUTO W/SCOPE: CPT | Performed by: EMERGENCY MEDICINE

## 2022-11-17 PROCEDURE — 99285 EMERGENCY DEPT VISIT HI MDM: CPT

## 2022-11-17 PROCEDURE — 81015 MICROSCOPIC EXAM OF URINE: CPT | Performed by: EMERGENCY MEDICINE

## 2022-11-17 PROCEDURE — 82962 GLUCOSE BLOOD TEST: CPT

## 2022-11-17 PROCEDURE — 70450 CT HEAD/BRAIN W/O DYE: CPT | Performed by: EMERGENCY MEDICINE

## 2022-11-17 PROCEDURE — 93010 ELECTROCARDIOGRAM REPORT: CPT | Performed by: EMERGENCY MEDICINE

## 2022-11-17 PROCEDURE — 80048 BASIC METABOLIC PNL TOTAL CA: CPT | Performed by: EMERGENCY MEDICINE

## 2022-11-17 PROCEDURE — 85025 COMPLETE CBC W/AUTO DIFF WBC: CPT | Performed by: EMERGENCY MEDICINE

## 2022-11-17 PROCEDURE — 36415 COLL VENOUS BLD VENIPUNCTURE: CPT

## 2022-11-17 PROCEDURE — 93005 ELECTROCARDIOGRAM TRACING: CPT

## 2022-11-17 NOTE — ED INITIAL ASSESSMENT (HPI)
Cancel stroke alert  Patient arrives via EMS for altered mental status, unknown to last known well. Patient answering questions ax3.    Moving all extremities for rn

## 2022-11-18 LAB
ATRIAL RATE: 60 BPM
P AXIS: 67 DEGREES
P-R INTERVAL: 264 MS
Q-T INTERVAL: 446 MS
QRS DURATION: 100 MS
QTC CALCULATION (BEZET): 446 MS
R AXIS: -14 DEGREES
T AXIS: 91 DEGREES
VENTRICULAR RATE: 60 BPM

## 2022-11-18 NOTE — ED QUICK NOTES
Incontinence care provided, patient cleaned. Patient able to stand and ambulate with assistance in room. [place] [facility] [facility] [place] minutes    Patient cleared for discharge by MD. Belongings with patient. Patient discharge instructions reviewed with patient including when and how to follow up  with healthcare provider and when to seek medical treatment. Peripheral IV removed. Medication use and prescriptions reviewed.

## 2023-06-02 ENCOUNTER — APPOINTMENT (OUTPATIENT)
Dept: CT IMAGING | Age: 78
End: 2023-06-02
Attending: EMERGENCY MEDICINE

## 2023-06-02 ENCOUNTER — APPOINTMENT (OUTPATIENT)
Dept: GENERAL RADIOLOGY | Age: 78
End: 2023-06-02
Attending: EMERGENCY MEDICINE

## 2023-06-02 ENCOUNTER — HOSPITAL ENCOUNTER (EMERGENCY)
Age: 78
Discharge: NURSING, REHAB OR LONG-TERM CARE | End: 2023-06-02
Attending: EMERGENCY MEDICINE

## 2023-06-02 VITALS
HEART RATE: 64 BPM | RESPIRATION RATE: 15 BRPM | OXYGEN SATURATION: 98 % | SYSTOLIC BLOOD PRESSURE: 139 MMHG | TEMPERATURE: 97.8 F | DIASTOLIC BLOOD PRESSURE: 78 MMHG | WEIGHT: 217.81 LBS

## 2023-06-02 DIAGNOSIS — G30.9 ALZHEIMER'S DISEASE (CMD): Primary | ICD-10-CM

## 2023-06-02 DIAGNOSIS — Z13.9 ENCOUNTER FOR MEDICAL SCREENING EXAMINATION: ICD-10-CM

## 2023-06-02 DIAGNOSIS — F02.80 ALZHEIMER'S DISEASE (CMD): Primary | ICD-10-CM

## 2023-06-02 LAB
ALBUMIN SERPL-MCNC: 3.4 G/DL (ref 3.6–5.1)
ALP SERPL-CCNC: 99 UNITS/L (ref 45–117)
ALT SERPL-CCNC: 37 UNITS/L
ANION GAP SERPL CALC-SCNC: 11 MMOL/L (ref 7–19)
APPEARANCE UR: CLEAR
AST SERPL-CCNC: 37 UNITS/L
BACTERIA #/AREA URNS HPF: ABNORMAL /HPF
BASOPHILS # BLD: 0 K/MCL (ref 0–0.3)
BASOPHILS NFR BLD: 1 %
BILIRUB CONJ SERPL-MCNC: 0.2 MG/DL (ref 0–0.2)
BILIRUB SERPL-MCNC: 0.6 MG/DL (ref 0.2–1)
BILIRUB UR QL STRIP: NEGATIVE
BUN SERPL-MCNC: 53 MG/DL (ref 6–20)
BUN/CREAT SERPL: 33 (ref 7–25)
CALCIUM SERPL-MCNC: 9.5 MG/DL (ref 8.4–10.2)
CHLORIDE SERPL-SCNC: 114 MMOL/L (ref 97–110)
CO2 SERPL-SCNC: 21 MMOL/L (ref 21–32)
COLOR UR: COLORLESS
CREAT SERPL-MCNC: 1.63 MG/DL (ref 0.67–1.17)
DEPRECATED RDW RBC: 44.7 FL (ref 39–50)
EOSINOPHIL # BLD: 0.1 K/MCL (ref 0–0.5)
EOSINOPHIL NFR BLD: 2 %
ERYTHROCYTE [DISTWIDTH] IN BLOOD: 12.8 % (ref 11–15)
FASTING DURATION TIME PATIENT: ABNORMAL H
GFR SERPLBLD BASED ON 1.73 SQ M-ARVRAT: 43 ML/MIN
GLUCOSE SERPL-MCNC: 171 MG/DL (ref 70–99)
GLUCOSE UR STRIP-MCNC: NEGATIVE MG/DL
HCT VFR BLD CALC: 34 % (ref 39–51)
HGB BLD-MCNC: 11.7 G/DL (ref 13–17)
HGB UR QL STRIP: ABNORMAL
HYALINE CASTS #/AREA URNS LPF: ABNORMAL /LPF
IMM GRANULOCYTES # BLD AUTO: 0 K/MCL (ref 0–0.2)
IMM GRANULOCYTES # BLD: 0 %
KETONES UR STRIP-MCNC: NEGATIVE MG/DL
LEUKOCYTE ESTERASE UR QL STRIP: NEGATIVE
LYMPHOCYTES # BLD: 2.6 K/MCL (ref 1–4)
LYMPHOCYTES NFR BLD: 33 %
MAGNESIUM SERPL-MCNC: 2.2 MG/DL (ref 1.7–2.4)
MCH RBC QN AUTO: 32.5 PG (ref 26–34)
MCHC RBC AUTO-ENTMCNC: 34.4 G/DL (ref 32–36.5)
MCV RBC AUTO: 94.4 FL (ref 78–100)
MONOCYTES # BLD: 0.8 K/MCL (ref 0.3–0.9)
MONOCYTES NFR BLD: 10 %
MUCOUS THREADS URNS QL MICRO: PRESENT
NEUTROPHILS # BLD: 4.4 K/MCL (ref 1.8–7.7)
NEUTROPHILS NFR BLD: 54 %
NITRITE UR QL STRIP: NEGATIVE
NRBC BLD MANUAL-RTO: 0 /100 WBC
PH UR STRIP: 5.5 [PH] (ref 5–7)
PLATELET # BLD AUTO: 148 K/MCL (ref 140–450)
POTASSIUM SERPL-SCNC: 4 MMOL/L (ref 3.4–5.1)
PROT SERPL-MCNC: 7 G/DL (ref 6.4–8.2)
PROT UR STRIP-MCNC: 100 MG/DL
RBC # BLD: 3.6 MIL/MCL (ref 4.5–5.9)
RBC #/AREA URNS HPF: ABNORMAL /HPF
SODIUM SERPL-SCNC: 142 MMOL/L (ref 135–145)
SP GR UR STRIP: 1.01 (ref 1–1.03)
SQUAMOUS #/AREA URNS HPF: ABNORMAL /HPF
UROBILINOGEN UR STRIP-MCNC: 0.2 MG/DL
WBC # BLD: 8 K/MCL (ref 4.2–11)
WBC #/AREA URNS HPF: ABNORMAL /HPF

## 2023-06-02 PROCEDURE — 81001 URINALYSIS AUTO W/SCOPE: CPT | Performed by: EMERGENCY MEDICINE

## 2023-06-02 PROCEDURE — 80076 HEPATIC FUNCTION PANEL: CPT | Performed by: EMERGENCY MEDICINE

## 2023-06-02 PROCEDURE — 71045 X-RAY EXAM CHEST 1 VIEW: CPT

## 2023-06-02 PROCEDURE — 99284 EMERGENCY DEPT VISIT MOD MDM: CPT

## 2023-06-02 PROCEDURE — 85025 COMPLETE CBC W/AUTO DIFF WBC: CPT | Performed by: EMERGENCY MEDICINE

## 2023-06-02 PROCEDURE — 36415 COLL VENOUS BLD VENIPUNCTURE: CPT

## 2023-06-02 PROCEDURE — 70450 CT HEAD/BRAIN W/O DYE: CPT

## 2023-06-02 PROCEDURE — P9612 CATHETERIZE FOR URINE SPEC: HCPCS

## 2023-06-02 PROCEDURE — 83735 ASSAY OF MAGNESIUM: CPT | Performed by: EMERGENCY MEDICINE

## 2023-06-02 PROCEDURE — 80048 BASIC METABOLIC PNL TOTAL CA: CPT | Performed by: EMERGENCY MEDICINE

## 2023-06-02 ASSESSMENT — PAIN SCALES - GENERAL: PAINLEVEL_OUTOF10: 0

## 2023-06-20 ENCOUNTER — HOSPITAL ENCOUNTER (EMERGENCY)
Age: 78
Discharge: NURSING, REHAB OR LONG-TERM CARE | End: 2023-06-20
Attending: EMERGENCY MEDICINE

## 2023-06-20 ENCOUNTER — APPOINTMENT (OUTPATIENT)
Dept: CT IMAGING | Age: 78
End: 2023-06-20
Attending: EMERGENCY MEDICINE

## 2023-06-20 VITALS
RESPIRATION RATE: 18 BRPM | TEMPERATURE: 97.8 F | DIASTOLIC BLOOD PRESSURE: 84 MMHG | HEART RATE: 80 BPM | SYSTOLIC BLOOD PRESSURE: 131 MMHG | OXYGEN SATURATION: 98 % | HEIGHT: 71 IN | WEIGHT: 213.41 LBS | BODY MASS INDEX: 29.88 KG/M2

## 2023-06-20 DIAGNOSIS — F03.C0 SEVERE DEMENTIA, UNSPECIFIED DEMENTIA TYPE, UNSPECIFIED WHETHER BEHAVIORAL, PSYCHOTIC, OR MOOD DISTURBANCE OR ANXIETY (CMD): ICD-10-CM
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
## 2023-06-20 PROCEDURE — G1004 CDSM NDSC: HCPCS

## 2023-06-20 PROCEDURE — 99284 EMERGENCY DEPT VISIT MOD MDM: CPT

## 2023-06-20 PROCEDURE — 70450 CT HEAD/BRAIN W/O DYE: CPT

## 2023-06-20 PROCEDURE — 10004651 HB RX, NO CHARGE ITEM: Performed by: EMERGENCY MEDICINE

## 2023-06-20 PROCEDURE — 72125 CT NECK SPINE W/O DYE: CPT

## 2023-06-20 RX ORDER — ACETAMINOPHEN 500 MG
1000 TABLET ORAL ONCE
Status: COMPLETED | OUTPATIENT
Start: 2023-06-20 | End: 2023-06-20

## 2023-06-20 RX ADMIN — ACETAMINOPHEN 1000 MG: 500 TABLET ORAL at 11:33

## (undated) NOTE — IP AVS SNAPSHOT
Patient Demographics     Address  [place] [address] Phone  [phone] (Home) *Preferred*  [phone] [facility])      Emergency Contact(s)     Name Relation Home Work Pedro Trivedi [facility] [phone]- these medications    apixaban 5 MG Tabs  Commonly known as: ELIQUIS     aspirin 81 MG Chew     finasteride 5 MG Tabs  Commonly known as: PROSCAR     GABAPENTIN OR     LIPITOR OR     lisinopril 5 MG Tabs     metoprolol tartrate 25 MG Tabs  Commonly known as escitalopram 5 MG Tabs  Commonly known as: LEXAPRO      Take 1 tablet (5 mg total) by mouth nightly. Watch severe muscle stiffness/fever   CARI HOOPER MD         finasteride 5 MG Tabs  Commonly known as: PROSCAR      Take 5 mg by mouth nightly. tamsulosin HCl 0.4 MG Caps  Commonly known as: FLOMAX      Take 0.4 mg by mouth nightly. Vitamin D3 (Cholecalciferol) 25 MCG (1000 UT) Tabs  Commonly known as: VITAMIN D3      Take 1,000 Units by mouth daily.                 Where to Get Your Medic mg 08/20/21 0945 Given      [id number] hydrALAzine HCl (APRESOLINE) injection 10 mg 08/20/21 0447 Given  bp:168/71    [id number] magnesium oxide (MAG-OX) tab 400 mg 08/20/21 0937 Given      [id number] metoprolol tartrate (LOPRESSOR) tab 25 mg 08/19/21 2025 Gi mg/dL [facility])   Sodium 141 136 - 145 mmol/L — [facility])   Potassium 3.3 3.5 - 5.1 mmol/L [facility])   Chloride 111 98 - 112 mmol/L — [facility]   CO2 22.0 21.0 - 32.0 mmol/L — [facility] (Critical [facility] hospital)   Anion Gap 8 0 - 18 B12 Reference Range      Deficient:      <150 pg/mL      Indeterminate   150-192 pg/mL      Normal:         193-986 pg/mL      This test may exhibit interference when a sample is collected from a person who is consuming high dose of biotin (a.k.a., vitamin Completed Lab Status: Final result Updated: 08/14/21 2227    Specimen: Other from [place]      Rapid SARS-CoV-2 by PCR Not Detected         H&P - H&P Note      H&P signed by Yari Roper MD at 8/15/2021  8:15 AM  Version 1 of 1    Author: Yair Roper Drug: Cocaine. He reports that he does not drink alcohol. Allergies:    Metformin               DIARRHEA    Home Medications:  Prior to Admission Medications   Prescriptions Last Dose Informant Patient Reported? Taking?    Atorvastatin Calcium (LIPITOR O Negative  Endocrine:  Negative. Immunologic:  Negative. Musculoskeletal:  Negative. Integumentary:  Negative. Neurologic:  Negative. Psychiatric:  Negative.   ROS reviewed as documented in chart    Physical Exam:  Temp:  [98.2 °F (36.8 °C)] 98.2 °F (36 Possible ischemic bowel  CT scan suggestive of pneumatosis, lactate however reassuring. Surgery has been consulted patient will remain n.p.o. for now, IV fluids have been initiated. Will use morphine as needed for pain, Zofran for nausea.     Acute on chr psychiatric consult for evaluation and advice. Consult Duration     The patient seen for initial psychiatric consult evaluation. Record reviewed, communication with attending, communication with RN and patient seen face to face evaluation.     History he is answering questions but can easily tell a story or sing a song when he wants to. She reports that he is suspicious of psychiatry and male caregivers, reporting that the male caregivers in CCU were not nice.  RN reports that Sergio Paz was talking to her ab Cellulitis    • CKD (chronic kidney disease)    • Diabetes ([facility] [address]    • Essential hypertension    • Gout    • Hyperlipidemia    • Neuropathy    • Obesity    • PAF (paroxysmal atrial fibrillation) (HCC)    • Venous insufficiency        Past Surgical History Oral, Nightly  metoprolol Tartrate (LOPRESSOR) injection 5 mg, 5 mg, Intravenous, Q6H PRN  Piperacillin Sod-Tazobactam So (ZOSYN) 4.5 g in dextrose 5% 100 mL IVPB-ADDV, 4.5 g, Intravenous, Q8H  morphINE sulfate (PF) 2 MG/ML injection 3 mg, 3 mg, Intravenou Units by mouth daily. lisinopril 5 MG Oral Tab, Take 2.5 mg by mouth daily. Atorvastatin Calcium (LIPITOR OR), Take 40 mg by mouth. GABAPENTIN OR, Take 300 mg by mouth in the morning, at noon, and at bedtime.           Allergies    Metformin pancolonic diverticulosis. 4. Cardiomegaly and coronary atherosclerosis with the development of trace bilateral pleural effusions suggesting mild CHF/fluid overload. 5. No biliary ductal dilatation in this post cholecystectomy patient.  6. Stable small fat 1420 hours to the inpatient floor and discussed with Dr. Arlin Gonzalez.     Dictated by (CST): Keshav Zaldivar MD on 8/17/2021 at 2:11 PM     Finalized by (CST): Keshav Zaldivar MD on 8/17/2021 at 2:25 PM            Vital Signs:     Blood pressure 139/69, pulse 69 insight orientation and encourage cooperation. 3.  Start Lexapro 5 mg nightly. 4.  Start Ritalin 5 mg daily to improve energy and motivation.   5.  Coordinate treatment plan with the team.  6.  Follow-up during this admission    Orders This Visit:  Orders Manda Jeffries MD Service: Hospitalist Author Type: Physician    Filed: 8/20/2021  9:27 AM Date of Service: 8/20/2021  9:26 AM Status: Signed    : Carlo Ordoñez MD (Physician)       Dc summary#  > 30 min spent on [facility] Discharge Chapis Villagomez to hold weight with RW static standing CGA. Pt is able to ambulate 180ft with chair follow and WBOS with use of RW. PPE worn by therapist: mask, gloves and goggles. Patient was wearing a mask during session. Patient presented in bed.  Patient with fair  pr much difficulty does the patient currently have. ..  -   Turning over in bed (including adjusting bedclothes, sheets and blankets)?: A Lot   -   Sitting down on and standing up from a chair with arms (e.g., wheelchair, bedside commode, etc.): A Lot   -   Mo Physical Therapist    Filed: 8/17/2021  3:20 PM Date of Service: 8/17/2021  2:30 PM Status: Signed    : Joye Goodpasture, PT (Physical Therapist)        PHYSICAL THERAPY EVALUATION - INPATIENT     Room Number: 465/465-A  Evaluation Date: 8/17/2021  T Ambulation: Mod A x 2 with RW. X 2 side steps toward HOB. Patient in chair at end of session with needs in reach and alarm activated.        The patient's[TL.1] Approx Degree of Impairment: 72.57%[TL.2] has been calculated based on documentation in the historian. Reports he ambulates with RW. Assist with all ADL's and IADL's from staff    SUBJECTIVE  \"just don't grab onto my legs hard. Go easy! \"    PHYSICAL THERAPY EXAMINATION     OBJECTIVE[TL.1]  Precautions: Bed/chair alarm  Fall Risk: High fall risk wheelchair)?: A Lot   -   Need to walk in hospital room?: A Lot   -   Climbing 3-5 steps with a railing?: Total[TL. 2]     AM-PAC Score:[TL.1]  Raw Score: 11   Approx Degree of Impairment: 72.57%   Standardized Score (AM-PAC Scale): 33.86   CMS Modifier (G- SLP Note signed by VANIA Negron at 8/19/2021  3:31 PM  Version 1 of 1    Author: Royal [place], SLP Service: Rehab Author Type: SPEECH-LANGUAGE PATHOLOGIST    Filed: 8/19/2021  3:31 PM Date of Service: 8/19/2021 11:18 AM Status: Signed SWALLOW GOALS  Goal #1 The patient will tolerate full liquid diet of pureed solid consistency and thin liquids without overt signs or symptoms of aspiration with 100 % accuracy over 1-2 session(s). [MD.1]    No CSA on pureed nor thin liquids during this fir have any questions, please contact[MD.1]   Milly Ayon M.S. CCC/SLP  Speech-Language Pathologist  [facility]  #[id number][MD.2]        Electronically signed by VANIA Webb on 8/19/2021  3:31 PM   Attribution Carvalho    MD.1 - Megan Delarosa

## (undated) NOTE — IP AVS SNAPSHOT
[facility]            (For Outpatient Use Only) Initial Admit Date: 5/21/2021   Inpt/Obs Admit Date: Inpt: 5/21/21 / Obs: N/A   Discharge Date:    Lisa Pagan:  [de-identified]   MRN: [de-identified]   CSN: [id number]   CEID: [id number]        DANIE Subscriber Name:  Gautam Bah :    Subscriber ID:  Pt Rel to Subscriber:    Hospital Account Financial Class: Medicare Advantage    May 24, 2021

## (undated) NOTE — IP AVS SNAPSHOT
Patient Demographics     Address  [address] [place] [id number] Phone  [phone] (Home) *Preferred*  [phone] [facility])      Emergency Contact(s)     Name Relation Home Work [place] [address]   [phone]      Al ELIQUIS  Next dose due: Tonight around 9pm      Take 5 mg by mouth 2 (two) times daily. aspirin 81 MG Chew  Next dose due: Tomorrow morning      Chew 81 mg by mouth daily.           finasteride 5 MG Tabs  Commonly known as: PROSCAR  Next dose due: Commonly known as: VITAMIN D3  Next dose due: Tomorrow morning      Take 1,000 Units by mouth daily.                 Where to Get Your Medications      Please  your prescriptions at the location directed by your doctor or nurse    Bring a paper presc 05/24/2021 0850   Temp  98.2 °F (36.8 °C) Filed at 05/24/2021 0850   SpO2  99 % Filed at 05/24/2021 0850      Patient's Most Recent Weight       Most Recent Value   Patient Weight  97.4 kg (214 lb 12.8 oz)         Lab Results Last 24 Hours      MAGNESIUM [ The following orders were created for panel order CBC WITH DIFFERENTIAL WITH PLATELET.   Procedure                               Abnormality         Status                     ---------                               -----------         ------ pulmonary vascular congestion with pulmonary edema  -pro bnp 907[GB.1]  -home diuretics- lasix 40 mg bid  -IV bumex  -echo  -cards eval    Chronic LE Edema  Venous Insuff  LE Cellulitis ? [GB.2]  -5/14 US legs- negative for DVT, subcutaneous edema in B/L le Illness:[GB.1]     Pt lives at [facility], he uses walker, limited knowledge on his medical problems and is uable to tell me his meds as \"staff give me my  Meds\".  He has had several ER visits, most recently on 5/14 and dx with cellulitis and given keflex Recent Labs   Lab 05/14/21  1514 05/21/21  1202   WBC 5.9 5.8   HGB 9.9* 9.5*   MCV 99.3 101.0*   .0 139.0*       Recent Labs   Lab 05/14/21  1514 05/21/21  1202    140   K 4.8 4.8    106   CO2 31.0 27.0   BUN 61* 62*   CREATSERUM 1.74* cooperative  Neuro: no focal deficits    Assessment and Plan[TN.1]  Acute CHF  -2017 echo with normal EF  -5/14 US legs- negative for DVT, subcutaneous edema in B/L legs   -CXR with pulmonary vascular congestion with pulmonary edema  -pro bnp 907[GB.1]  -h Signed    : Oniel Rodriguez NP (Nurse Practitioner)    Related Notes: Addendum by Michele Mobley MD (Physician) filed at 5/21/2021  5:10 PM       [facility] Hospitalist Team  History and Physical  Admit Date:[GB.1]  5/21/21[GB. 2]    ASSESSMENT / PLAN:   69 yo Selin taken off the list as contact, he has no other person to name as support. No POA tells me he is cognitively good and doesn't need anyone to make decisions for him[GB. 2]    FEN  -lytes in am  -diet-[GB. 1]ADA[GB.2]    Prophy  -SCD[GB.1]  -eliquis[GB [facility])    • Essential hypertension    • Hyperlipidemia    • Obesity[GB. 1]     gout[GB.2]    PSH  Past Surgical History:   Procedure Laterality Date   • CHOLECYSTECTOMY     • HERNIA SURGERY          ALL:    Metformin               DIARRHEA     Home Medicatio Consults signed by Thien Lazar MD at 5/22/2021 11:23 AM     Author: Thien Lazar MD Service: Cardiology Author Type: Physician    Filed: 5/22/2021 11:23 AM Date of Service: 5/22/2021 11:21 AM Status: Signed    : Thien Lazar MD [facility] MD Cleo on 5/21/2021 at 1:11 PM          EKG 12-LEAD    Result Date: 5/21/2021  ECG Report  Interpretation  -------------------------- Normal sinus rhythm -Anteroseptal infarct -age undetermined.  ABNORMAL When compared with ECG of 05/14/2021 15:02:31No s Care Management team.  For all other patients, please follow usual protocol for discharge care transition.     Hospital Course:     Patient is a 78 yo male from [address] [place] [address] with hx CVA, CKD, PAF, PN, DM type II, HTN, HL, BPH with recent ER visit -continue flomax and proscar  -resume oxybutnin     GOC  -Full code  -no POA     EXAM:   GENERAL: no apparent distress  NEURO: A/A Ox3  RESP: non labored, CTA  CARDIO: Regular, no murmur  ABD: soft, NT, ND, BS+  EXTREMITIES: improved LE edema    Operative (Cholecalciferol) 25 MCG (1000 UT) Tabs  Commonly known as: VITAMIN D3        STOP taking these medications    cephALEXin 500 MG Caps  Commonly known as: Judy Deluna           Where to Get Your Medications      You can get these medications from any pharmacy MD César on 5/24/2021  1:25 PM                     Imaging Results (HF patients)    Chest X-Ray Results (HF patients only)    No exam resulted this encounter.       2D Echocardiogram Results (HF patients only)    CARD ECHO 2D DOPPLER (CPT=93306)    Result Da thickened, moderately calcified leaflets. Valve area by pressure    half-time: 1.88cm^2. 4. Left atrium: The atrium was mildly dilated. No previous study was available for comparison.  ------------------------------------------------------------------- Abraham Nichole Mitral valve:   Mildly to moderately calcified annulus. Mildly thickened, moderately calcified leaflets. Mobility was not restricted. Doppler: There was no evidence for stenosis. Trivial regurgitation. Valve area by pressure half-time: 1.88cm^2.  Inde 5.33  cm/sec   ---------  LV E/e', medial                           16             ---------  LV e', average                            6.2   cm/sec   ---------  LV E/e', average                          14             ---------   Ventricular septum Mitral A-wave peak velocity               115   cm/sec   ---------  Mitral deceleration time                  401   ms       ---------  Mitral pressure half-time                 117   ms       ---------  Mitral peak gradient, D                   3     mm H male admitted 5/21/2021 for CHF and worsening bilateral LE pitting edema. Pt is a new resident of [facility]. He has been there for about 1 month.  Pt reports use of RW in apt and facility, receives assistance for ADLs/IADLs and denies recent falls Azotemia    Hyperglycemia    Lower extremity edema[KB. 2]      Past Medical History[KB. 1]  Past Medical History:   Diagnosis Date   • BPH (benign prostatic hyperplasia)    • Cellulitis    • CKD (chronic kidney disease)    • Diabetes ([facility].)    • Essential hyp blankets)?: A Little   -   Sitting down on and standing up from a chair with arms (e.g., wheelchair, bedside commode, etc.): A Little   -   Moving from lying on back to sitting on the side of the bed?: A Little[KB. 2]   How much help from another person olson Farrukh Haro, PT on 5/22/2021  1:28 PM  KB. 2 - Farrukh Haro, PT on 5/22/2021  1:29 PM                        Occupational Therapy Notes (last 72 hours) (Notes from 5/21/2021  1:31 PM through 5/24/2021  1:31 PM)      Occupational Therapy Note signed b with Rene - VCs for optimal walker use (attempting to turn around using grab bars vs turning around while maintaining grasp of walker), VCs for pants mgmt & assist. Req Rene with LB dressing (brief mgmt); pt endorses he typically uses a cane for this task. of Home: Assisted living facility  Home Layout: Able to live on main level  Lives With: Alone     Toilet and Equipment: Toilet riser;Grab bar  Shower/Tub and Equipment:  (staff assists with bathing in shower room)  Other Equipment:  (uses cane as reacher f Transfer: CGA - VCs for optimal hand placement    Bedroom Mobility: CGA with walker    FUNCTIONAL ADL ASSESSMENT  Grooming: CGA  Feeding: IND  Bathing: required assist prior to admit  Toileting: Rene  Lower Extremity Dressing: Rene    Education Provided: SERENA

## (undated) NOTE — IP AVS SNAPSHOT
[facility]            (For Outpatient Use Only) Initial Admit Date: 8/14/2021   Inpt/Obs Admit Date: Inpt: 8/15/21 / Obs: N/A   Discharge Date:    Donald Binet:  [de-identified]   MRN: [de-identified]   CSN: [id number]   CEID: [id number]        [facility] INSURANCE   Payor:  Plan:    Group Number:  Insurance Type:    Subscriber Name:  Subscriber :    Subscriber ID:  Pt Rel to Subscriber:    Hospital Account Financial Class: Medicare Advantage    2021

## (undated) NOTE — IP AVS SNAPSHOT
[facility]            (For Outpatient Use Only) Initial Admit Date: 10/19/2022   Inpt/Obs Admit Date: Inpt: N/A / Obs: 10/19/22   Discharge Date:    Hospital Acct:  [de-identified]   MRN: [de-identified]   CSN: [id number]   CEID: [id number]        ENCOUNTER  Patient Class: Observation Admitting Provider: Yanet Fofana MD Unit: [address] [facility] Service: Cardiac Telemetry Attending Provider: Yanet Fofana MD   Bed: 506-A   Visit Type:   Referring Physician: No ref. provider found Billing Flag:    Admit Diagnosis: Altered mental status, unspecified altered mental status type [R41.82]      PATIENT  Legal Name:   Jessica Vargas   Legal Sex: Male  Gender ID:              Pref Name:    PCP:  Tonya Tabares [facility] [address]: [phone]   Address:  [place] [address] : 1945 (77 yrs) Mobile: [phone]         City/[facility]: [place] [address] Marital: Single Language: Estrelladarian Gallo [place] SSN4: [id number] Church: No Church Indicated/Gi*     Race: White Ethnicity: Non  Or  O*   EMERGENCY CONTACT   Name Relationship Legal Guardian? Home Phone Work Phone Mobile Phone   1. Reanna Trinh  2. Marianna [place]  Daughter    [phone]     GUARANTOR  Guarantor: Levon Matson : 1945 Home Phone: [phone]   Address: [place] [address]  Sex:  Male Work Phone:    [facility]   Rel. to Patient: Self Guarantor ID: [id number]   [address]   Employer:  Status: RETIRED     COVERAGE  PRIMARY INSURANCE   Payor: MEDICARE Plan: MEDICARE PART A&B   Group Number:  Insurance Type: INDEMNITY   Subscriber Name: Manjeet Zamora : 1945   Subscriber ID: [id number] Pt Rel to Subscriber: Self   SECONDARY INSURANCE   Payor: MEDICAID Plan: MEDICAID   Group Number: [id number] Insurance Type: [address] Name: Manjeet Zamora : 1945   Subscriber ID: [id number] Pt Rel to Subscriber: SELF   TERTIARY INSURANCE   Payor:  Plan:    Group Number:  Insurance Type:    Subscriber Name:  Subscriber :    Subscriber ID:  Pt Rel to Subscriber:    Hospital Account Financial Class: Medicare    2022